# Patient Record
Sex: MALE | Race: WHITE | Employment: OTHER | URBAN - METROPOLITAN AREA
[De-identification: names, ages, dates, MRNs, and addresses within clinical notes are randomized per-mention and may not be internally consistent; named-entity substitution may affect disease eponyms.]

---

## 2017-01-22 ENCOUNTER — HOSPITAL ENCOUNTER (INPATIENT)
Age: 72
LOS: 4 days | Discharge: HOME | DRG: 871 | End: 2017-01-26
Attending: EMERGENCY MEDICINE | Admitting: FAMILY MEDICINE
Payer: MEDICARE

## 2017-01-22 ENCOUNTER — APPOINTMENT (OUTPATIENT)
Dept: GENERAL RADIOLOGY | Age: 72
DRG: 871 | End: 2017-01-22
Attending: EMERGENCY MEDICINE
Payer: MEDICARE

## 2017-01-22 DIAGNOSIS — E86.0 DEHYDRATION: ICD-10-CM

## 2017-01-22 DIAGNOSIS — R06.2 WHEEZING: ICD-10-CM

## 2017-01-22 DIAGNOSIS — J18.9 COMMUNITY ACQUIRED PNEUMONIA: Primary | ICD-10-CM

## 2017-01-22 DIAGNOSIS — R09.02 HYPOXIA: ICD-10-CM

## 2017-01-22 LAB
ALBUMIN SERPL BCP-MCNC: 3.3 G/DL (ref 3.5–5)
ALBUMIN/GLOB SERPL: 0.8 {RATIO} (ref 1.1–2.2)
ALP SERPL-CCNC: 112 U/L (ref 45–117)
ALT SERPL-CCNC: 17 U/L (ref 12–78)
ANION GAP BLD CALC-SCNC: 8 MMOL/L (ref 5–15)
AST SERPL W P-5'-P-CCNC: 14 U/L (ref 15–37)
BASOPHILS # BLD AUTO: 0 K/UL (ref 0–0.1)
BASOPHILS # BLD: 0 % (ref 0–1)
BILIRUB SERPL-MCNC: 0.5 MG/DL (ref 0.2–1)
BUN SERPL-MCNC: 12 MG/DL (ref 6–20)
BUN/CREAT SERPL: 9 (ref 12–20)
CALCIUM SERPL-MCNC: 9.4 MG/DL (ref 8.5–10.1)
CHLORIDE SERPL-SCNC: 98 MMOL/L (ref 97–108)
CK SERPL-CCNC: 41 U/L (ref 39–308)
CO2 SERPL-SCNC: 27 MMOL/L (ref 21–32)
CREAT SERPL-MCNC: 1.3 MG/DL (ref 0.7–1.3)
EOSINOPHIL # BLD: 0.5 K/UL (ref 0–0.4)
EOSINOPHIL NFR BLD: 4 % (ref 0–7)
ERYTHROCYTE [DISTWIDTH] IN BLOOD BY AUTOMATED COUNT: 13.6 % (ref 11.5–14.5)
GLOBULIN SER CALC-MCNC: 4.1 G/DL (ref 2–4)
GLUCOSE SERPL-MCNC: 141 MG/DL (ref 65–100)
HCT VFR BLD AUTO: 37.1 % (ref 36.6–50.3)
HGB BLD-MCNC: 12.3 G/DL (ref 12.1–17)
LACTATE SERPL-SCNC: 1.5 MMOL/L (ref 0.4–2)
LYMPHOCYTES # BLD AUTO: 7 % (ref 12–49)
LYMPHOCYTES # BLD: 0.9 K/UL (ref 0.8–3.5)
MCH RBC QN AUTO: 31.7 PG (ref 26–34)
MCHC RBC AUTO-ENTMCNC: 33.2 G/DL (ref 30–36.5)
MCV RBC AUTO: 95.6 FL (ref 80–99)
MONOCYTES # BLD: 1 K/UL (ref 0–1)
MONOCYTES NFR BLD AUTO: 8 % (ref 5–13)
NEUTS SEG # BLD: 9.7 K/UL (ref 1.8–8)
NEUTS SEG NFR BLD AUTO: 81 % (ref 32–75)
PLATELET # BLD AUTO: 294 K/UL (ref 150–400)
POTASSIUM SERPL-SCNC: 4.6 MMOL/L (ref 3.5–5.1)
PROT SERPL-MCNC: 7.4 G/DL (ref 6.4–8.2)
RBC # BLD AUTO: 3.88 M/UL (ref 4.1–5.7)
SODIUM SERPL-SCNC: 133 MMOL/L (ref 136–145)
TROPONIN I SERPL-MCNC: <0.04 NG/ML
WBC # BLD AUTO: 12.1 K/UL (ref 4.1–11.1)

## 2017-01-22 PROCEDURE — 74011000250 HC RX REV CODE- 250: Performed by: EMERGENCY MEDICINE

## 2017-01-22 PROCEDURE — 71020 XR CHEST PA LAT: CPT

## 2017-01-22 PROCEDURE — 96361 HYDRATE IV INFUSION ADD-ON: CPT

## 2017-01-22 PROCEDURE — 85025 COMPLETE CBC W/AUTO DIFF WBC: CPT | Performed by: EMERGENCY MEDICINE

## 2017-01-22 PROCEDURE — 65660000000 HC RM CCU STEPDOWN

## 2017-01-22 PROCEDURE — 99285 EMERGENCY DEPT VISIT HI MDM: CPT

## 2017-01-22 PROCEDURE — 83605 ASSAY OF LACTIC ACID: CPT | Performed by: EMERGENCY MEDICINE

## 2017-01-22 PROCEDURE — 74011250636 HC RX REV CODE- 250/636: Performed by: EMERGENCY MEDICINE

## 2017-01-22 PROCEDURE — 87040 BLOOD CULTURE FOR BACTERIA: CPT | Performed by: EMERGENCY MEDICINE

## 2017-01-22 PROCEDURE — 96375 TX/PRO/DX INJ NEW DRUG ADDON: CPT

## 2017-01-22 PROCEDURE — 36415 COLL VENOUS BLD VENIPUNCTURE: CPT | Performed by: EMERGENCY MEDICINE

## 2017-01-22 PROCEDURE — 96365 THER/PROPH/DIAG IV INF INIT: CPT

## 2017-01-22 PROCEDURE — 77030029684 HC NEB SM VOL KT MONA -A

## 2017-01-22 PROCEDURE — 93005 ELECTROCARDIOGRAM TRACING: CPT

## 2017-01-22 PROCEDURE — 80053 COMPREHEN METABOLIC PANEL: CPT | Performed by: EMERGENCY MEDICINE

## 2017-01-22 PROCEDURE — 82550 ASSAY OF CK (CPK): CPT | Performed by: EMERGENCY MEDICINE

## 2017-01-22 PROCEDURE — 84484 ASSAY OF TROPONIN QUANT: CPT | Performed by: EMERGENCY MEDICINE

## 2017-01-22 PROCEDURE — 94640 AIRWAY INHALATION TREATMENT: CPT

## 2017-01-22 RX ORDER — LEVOFLOXACIN 5 MG/ML
750 INJECTION, SOLUTION INTRAVENOUS
Status: COMPLETED | OUTPATIENT
Start: 2017-01-22 | End: 2017-01-22

## 2017-01-22 RX ORDER — ACETAMINOPHEN 325 MG/1
650 TABLET ORAL
Status: DISCONTINUED | OUTPATIENT
Start: 2017-01-22 | End: 2017-01-26 | Stop reason: HOSPADM

## 2017-01-22 RX ORDER — SODIUM CHLORIDE 9 MG/ML
125 INJECTION, SOLUTION INTRAVENOUS CONTINUOUS
Status: DISCONTINUED | OUTPATIENT
Start: 2017-01-22 | End: 2017-01-26 | Stop reason: HOSPADM

## 2017-01-22 RX ORDER — DIPHENHYDRAMINE HYDROCHLORIDE 50 MG/ML
25 INJECTION, SOLUTION INTRAMUSCULAR; INTRAVENOUS
Status: DISCONTINUED | OUTPATIENT
Start: 2017-01-22 | End: 2017-01-26 | Stop reason: HOSPADM

## 2017-01-22 RX ORDER — DIPHENHYDRAMINE HYDROCHLORIDE 50 MG/ML
25 INJECTION, SOLUTION INTRAMUSCULAR; INTRAVENOUS
Status: COMPLETED | OUTPATIENT
Start: 2017-01-22 | End: 2017-01-22

## 2017-01-22 RX ORDER — LEVOFLOXACIN 5 MG/ML
750 INJECTION, SOLUTION INTRAVENOUS EVERY 24 HOURS
Status: DISCONTINUED | OUTPATIENT
Start: 2017-01-23 | End: 2017-01-26 | Stop reason: HOSPADM

## 2017-01-22 RX ADMIN — SODIUM CHLORIDE 1000 ML: 900 INJECTION, SOLUTION INTRAVENOUS at 21:25

## 2017-01-22 RX ADMIN — DIPHENHYDRAMINE HYDROCHLORIDE 25 MG: 50 INJECTION, SOLUTION INTRAMUSCULAR; INTRAVENOUS at 21:25

## 2017-01-22 RX ADMIN — ALBUTEROL SULFATE 1 DOSE: 2.5 SOLUTION RESPIRATORY (INHALATION) at 21:57

## 2017-01-22 RX ADMIN — METHYLPREDNISOLONE SODIUM SUCCINATE 125 MG: 125 INJECTION, POWDER, FOR SOLUTION INTRAMUSCULAR; INTRAVENOUS at 21:25

## 2017-01-22 RX ADMIN — LEVOFLOXACIN 750 MG: 5 INJECTION, SOLUTION INTRAVENOUS at 22:02

## 2017-01-22 NOTE — IP AVS SNAPSHOT
Current Discharge Medication List  
  
Take these medications at their scheduled times Dose & Instructions Dispensing Information Comments Morning Noon Evening Bedtime  
 allopurinol 300 mg tablet Commonly known as:  Ardie Fleischer Your next dose is: Today, Tomorrow Other:  ____________ Dose:  300 mg Take 1 tablet by mouth two (2) times a day. For gout prevention Quantity:  60 tablet Refills:  5  
     
   
   
   
  
 amLODIPine 10 mg tablet Commonly known as:  [place] Your next dose is: Today, Tomorrow Other:  ____________ Dose:  10 mg Take 1 Tab by mouth daily. Quantity:  30 Tab Refills:  2  
     
   
   
   
  
 aspirin 81 mg chewable tablet Your next dose is: Today, Tomorrow Other:  ____________ Dose:  81 mg Take 1 Tab by mouth daily. Indications: TRANSIENT CEREBRAL ISCHEMIA Quantity:  30 Tab Refills:  1  
     
   
   
   
  
 atorvastatin 20 mg tablet Commonly known as:  LIPITOR Your next dose is: Today, Tomorrow Other:  ____________ Dose:  10 mg Take 10 mg by mouth daily. Refills:  0  
     
   
   
   
  
 citalopram 40 mg tablet Commonly known as:  Gildardo [place] Your next dose is: Today, Tomorrow Other:  ____________ Dose:  40 mg Take 1 Tab by mouth daily. Quantity:  90 Tab Refills:  1 CYMBALTA 20 mg capsule Generic drug:  DULoxetine Your next dose is: Today, Tomorrow Other:  ____________ Dose:  60 mg Take 60 mg by mouth daily. Refills:  0  
     
   
   
   
  
 levoFLOXacin 500 mg tablet Commonly known as:  Daniels Armor Your next dose is: Today, Tomorrow Other:  ____________ Dose:  500 mg Take 1 Tab by mouth daily for 4 days. Quantity:  4 Tab Refills:  0  
     
   
   
   
  
 lisinopril 20 mg tablet Commonly known as:  Rosalio Pichardo  
   
 Your next dose is: Today, Tomorrow Other:  ____________ Dose:  20 mg Take 1 Tab by mouth daily. Quantity:  30 Tab Refills:  5  
     
   
   
   
  
 meloxicam 15 mg tablet Commonly known as:  MOBIC Your next dose is: Today, Tomorrow Other:  ____________ Dose:  15 mg Take 1 Tab by mouth daily. Quantity:  30 Tab Refills:  5  
     
   
   
   
  
 metFORMIN 500 mg tablet Commonly known as:  GLUCOPHAGE Your next dose is: Today, Tomorrow Other:  ____________ Dose:  1000 mg Take 1,000 mg by mouth daily (with breakfast). Refills:  0  
     
   
   
   
  
 multivitamins-minerals-lutein Tab tablet Commonly known as:  CENTRUM SILVER Your next dose is: Today, Tomorrow Other:  ____________ Dose:  1 Tab Take 1 Tab by mouth daily. Quantity:  90 Tab Refills:  1  
     
   
   
   
  
 pantoprazole 40 mg tablet Commonly known as:  PROTONIX Your next dose is: Today, Tomorrow Other:  ____________ Dose:  40 mg Take 1 Tab by mouth daily. Quantity:  30 Tab Refills:  5 Saccharomyces boulardii 250 mg capsule Commonly known as:  Iván Controls Your next dose is: Today, Tomorrow Other:  ____________ Dose:  250 mg Take 1 Cap by mouth two (2) times a day for 30 days. OK to substitute other over-the-counter probiotic Quantity:  60 Cap Refills:  0 Take these medications as directed Dose & Instructions Dispensing Information Comments Morning Noon Evening Bedtime  
 albuterol 90 mcg/actuation inhaler Commonly known as:  PROVENTIL HFA, VENTOLIN HFA, PROAIR HFA Your next dose is: Today, Tomorrow Other:  ____________ Please take 2 puffs by inhalation through spacer four times a day for the next week.  After that, please reduce the frequency to every 6 hours as needed for shortness of breath or wheezing. Quantity:  1 Inhaler Refills:  1  
     
   
   
   
  
 gabapentin 800 mg tablet Commonly known as:  NEURONTIN Your next dose is: Today, Tomorrow Other:  ____________ TAKE TWO TABLETS BY MOUTH TWICE DAILY Quantity:  120 Tab Refills:  0  
     
   
   
   
  
 OTHER(NON-FORMULARY) Your next dose is: Today, Tomorrow Other:  ____________ Physical therapy to eval and treat Quantity:  1 Each Refills:  0  
     
   
   
   
  
 VITAMIN D3 2,000 unit Tab Generic drug:  cholecalciferol (vitamin D3) Your next dose is: Today, Tomorrow Other:  ____________ Take  by mouth. Refills:  0 Where to Get Your Medications These medications were sent to [id number] [employer] [facility],[address], [address] [place] [address] Way Phone:  [phone]  
  levoFLOXacin 500 mg tablet Saccharomyces boulardii 250 mg capsule Information about where to get these medications is not yet available ! Ask your nurse or doctor about these medications  
  albuterol 90 mcg/actuation inhaler

## 2017-01-22 NOTE — IP AVS SNAPSHOT
[address] 
[phone] Patient: Dipika Brock MRN: [id number] :1945 You are allergic to the following Allergen Reactions Cefdinir Hives Crestor (Rosuvastatin) Myalgia Recent Documentation Height Weight BMI Smoking Status 1.778 m 83 kg 26.26 kg/m2 Former Smoker Unresulted Labs Order Current Status CULTURE, BLOOD, PAIRED Preliminary result Emergency Contacts Name Discharge Info Relation Home Work Mobile [address] [place] CAREGIVER [3] Spouse [3] [phone] About your hospitalization You were admitted on:  2017 You last received care in the:  [facility] 2[facility] MED SURG You were discharged on:  2017 Unit phone number:  [phone] Why you were hospitalized Your primary diagnosis was:  Pneumonia Providers Seen During Your Hospitalizations Provider Role Specialty Primary office phone Severa Fergusson, MD Attending Provider Emergency Medicine [phone] Amandeep Coy MD Attending Provider [facility] [phone] Orion Lorenzo MD Attending Provider Internal Medicine [phone] Your Primary Care Physician (PCP) Primary Care Physician Office Phone Office Fax Rhiannon Estrada [phone] Follow-up Information Follow up With Details Comments Contact Info Ligia Sesay MD Schedule an appointment as soon as possible for a visit on 2017 For follow up after hospitalization *** PCP follow up appointment on Thursday, 17 @ 9:50 a.m. [address] [address] [phone] [employer]  This company will provide you with home oxygen. [address] [facility],[address] [facility] [id number] [phone] Current Discharge Medication List  
  
START taking these medications Dose & Instructions Dispensing Information Comments Morning Noon Evening Bedtime  
 albuterol 90 mcg/actuation inhaler Commonly known as:  PROVENTIL HFA, VENTOLIN HFA, PROAIR HFA Your next dose is: Today, Tomorrow Other:  _________ Please take 2 puffs by inhalation through spacer four times a day for the next week. After that, please reduce the frequency to every 6 hours as needed for shortness of breath or wheezing. Quantity:  1 Inhaler Refills:  1  
     
   
   
   
  
 levoFLOXacin 500 mg tablet Commonly known as:  Dorothy Rich Your next dose is: Today, Tomorrow Other:  _________ Dose:  500 mg Take 1 Tab by mouth daily for 4 days. Quantity:  4 Tab Refills:  0 Saccharomyces boulardii 250 mg capsule Commonly known as:  Iván Dunne Your next dose is: Today, Tomorrow Other:  _________ Dose:  250 mg Take 1 Cap by mouth two (2) times a day for 30 days. OK to substitute other over-the-counter probiotic Quantity:  60 Cap Refills:  0 CONTINUE these medications which have CHANGED Dose & Instructions Dispensing Information Comments Morning Noon Evening Bedtime  
 amLODIPine 10 mg tablet Commonly known as:  Theresa Payne What changed:  how much to take Your next dose is: Today, Tomorrow Other:  _________ Dose:  10 mg Take 1 Tab by mouth daily. Quantity:  30 Tab Refills:  2 CONTINUE these medications which have NOT CHANGED Dose & Instructions Dispensing Information Comments Morning Noon Evening Bedtime  
 allopurinol 300 mg tablet Commonly known as:  Ermelinda Bolus Your next dose is: Today, Tomorrow Other:  _________ Dose:  300 mg Take 1 tablet by mouth two (2) times a day. For gout prevention Quantity:  60 tablet Refills:  5  
     
   
   
   
  
 aspirin 81 mg chewable tablet Your next dose is: Today, Tomorrow Other:  _________ Dose:  81 mg Take 1 Tab by mouth daily. Indications: TRANSIENT CEREBRAL ISCHEMIA Quantity:  30 Tab Refills:  1  
     
   
   
   
  
 atorvastatin 20 mg tablet Commonly known as:  LIPITOR Your next dose is: Today, Tomorrow Other:  _________ Dose:  10 mg Take 10 mg by mouth daily. Refills:  0  
     
   
   
   
  
 citalopram 40 mg tablet Commonly known as:  Samaria Cox Your next dose is: Today, Tomorrow Other:  _________ Dose:  40 mg Take 1 Tab by mouth daily. Quantity:  90 Tab Refills:  1 CYMBALTA 20 mg capsule Generic drug:  DULoxetine Your next dose is: Today, Tomorrow Other:  _________ Dose:  60 mg Take 60 mg by mouth daily. Refills:  0  
     
   
   
   
  
 gabapentin 800 mg tablet Commonly known as:  NEURONTIN Your next dose is: Today, Tomorrow Other:  _________ TAKE TWO TABLETS BY MOUTH TWICE DAILY Quantity:  120 Tab Refills:  0  
     
   
   
   
  
 lisinopril 20 mg tablet Commonly known as:  Merilynn Aamir Your next dose is: Today, Tomorrow Other:  _________ Dose:  20 mg Take 1 Tab by mouth daily. Quantity:  30 Tab Refills:  5  
     
   
   
   
  
 meloxicam 15 mg tablet Commonly known as:  MOBIC Your next dose is: Today, Tomorrow Other:  _________ Dose:  15 mg Take 1 Tab by mouth daily. Quantity:  30 Tab Refills:  5  
     
   
   
   
  
 metFORMIN 500 mg tablet Commonly known as:  GLUCOPHAGE Your next dose is: Today, Tomorrow Other:  _________ Dose:  1000 mg Take 1,000 mg by mouth daily (with breakfast). Refills:  0  
     
   
   
   
  
 multivitamins-minerals-lutein Tab tablet Commonly known as:  CENTRUM SILVER Your next dose is: Today, Tomorrow Other:  _________ Dose:  1 Tab Take 1 Tab by mouth daily. Quantity:  90 Tab Refills:  1 OTHER(NON-FORMULARY) Your next dose is: Today, Tomorrow Other:  _________ Physical therapy to eval and treat Quantity:  1 Each Refills:  0  
     
   
   
   
  
 pantoprazole 40 mg tablet Commonly known as:  PROTONIX Your next dose is: Today, Tomorrow Other:  _________ Dose:  40 mg Take 1 Tab by mouth daily. Quantity:  30 Tab Refills:  5 VITAMIN D3 2,000 unit Tab Generic drug:  cholecalciferol (vitamin D3) Your next dose is: Today, Tomorrow Other:  _________ Take  by mouth. Refills:  0 STOP taking these medications   
 cefdinir 300 mg capsule Commonly known as:  OMNICEF Where to Get Your Medications These medications were sent to [id number] [employer],[address], [address] [facility], [address] Phone:  [phone]  
  levoFLOXacin 500 mg tablet Saccharomyces boulardii 250 mg capsule Information on where to get these meds will be given to you by the nurse or doctor. ! Ask your nurse or doctor about these medications  
  albuterol 90 mcg/actuation inhaler Discharge Instructions Please bring this form with you to show your primary care provider at your follow-up appointment. Primary care provider:  Dr. Clayton Kay MD 
 
Discharging provider:  Abby Pike MD 
 
You have been admitted to the hospital with the following diagnoses: · Pneumonia FOLLOW-UP CARE RECOMMENDATIONS: 
 
APPOINTMENTS: 
Follow-up Information Follow up With Details Comments Contact Info Clayton Kay MD Schedule an appointment as soon as possible for a visit in 1 week For follow up after hospitalization [address] [address] [phone] FOLLOW-UP TESTS recommended: · Chest X-ray in 4-6 weeks to ensure resolution of pneumonia · 6-minute walk with pulse oximetry in 2 weeks to check on continued need for oxygen SYMPTOMS to watch for: chest pain, shortness of breath, fever, chills, nausea, vomiting, diarrhea. DIET/what to eat:  Diabetic Diet ACTIVITY:  Activity as tolerated OTHER instructions:  Please use your oxygen by nasal cannula at 2 liters per minute continuously. Follow up with your primary care doctor for testing to see if you continue to need this. What to do if new or unexpected symptoms occur? If you experience any of the above symptoms (or should other concerns or questions arise after discharge) please call your primary care physician. Return to the emergency room if you cannot get hold of your doctor. · It is very important that you keep your follow-up appointment(s). · Please bring discharge papers, medication list (and/or medication bottles) to your follow-up appointments for review by your outpatient provider(s). · Please check the list of medications and be sure it includes every medication (even non-prescription medications) that your provider wants you to take. · It is important that you take the medication exactly as they are prescribed. · Keep your medication in the bottles provided by the pharmacist and keep a list of the medication names, dosages, and times to be taken in your wallet. · Do not take other medications without consulting your doctor. · If you have any questions about your medications or other instructions, please talk to your nurse or care provider before you leave the hospital. 
 
I understand that if any problems occur once I am at home I am to contact my physician. These instructions were explained to me and I had the opportunity to ask questions. Discharge Orders None [facility] Announcement We are excited to announce that we are making your provider's discharge notes available to you in [employer]. You will see these notes when they are completed and signed by the physician that discharged you from your recent hospital stay. If you have any questions or concerns about any information you see in [employer], please call the Health Information Department where you were seen or reach out to your Primary Care Provider for more information about your plan of care. Introducing \[facility]\"" & HEALTH SERVICES! Dear Mateo Mathew: Thank you for requesting a [employer] account. Our records indicate that you already have an active [employer] account. You can access your account anytime at [email or web address] Did you know that you can access your hospital and ER discharge instructions at any time in [employer]? You can also review all of your test results from your hospital stay or ER visit. Additional Information If you have questions, please visit the Frequently Asked Questions section of the [employer] website at [email or web address] Remember, [employer] is NOT to be used for urgent needs. For medical emergencies, dial 911. Now available from your iPhone and Android! General Information Please provide this summary of care documentation to your next provider. Patient Signature:  ____________________________________________________________ Date:  ____________________________________________________________  
  
Reyna Cordoba Provider Signature:  ____________________________________________________________ Date:  ____________________________________________________________

## 2017-01-23 LAB
ANION GAP BLD CALC-SCNC: 12 MMOL/L (ref 5–15)
ARTERIAL PATENCY WRIST A: NO
ATRIAL RATE: 112 BPM
BASE DEFICIT BLD-SCNC: 5 MMOL/L
BASOPHILS # BLD AUTO: 0 K/UL (ref 0–0.1)
BASOPHILS # BLD: 0 % (ref 0–1)
BDY SITE: ABNORMAL
BUN SERPL-MCNC: 12 MG/DL (ref 6–20)
BUN/CREAT SERPL: 9 (ref 12–20)
CALCIUM SERPL-MCNC: 8.7 MG/DL (ref 8.5–10.1)
CALCULATED P AXIS, ECG09: 41 DEGREES
CALCULATED R AXIS, ECG10: -6 DEGREES
CALCULATED T AXIS, ECG11: 85 DEGREES
CHLORIDE SERPL-SCNC: 101 MMOL/L (ref 97–108)
CO2 SERPL-SCNC: 21 MMOL/L (ref 21–32)
CREAT SERPL-MCNC: 1.34 MG/DL (ref 0.7–1.3)
DIAGNOSIS, 93000: NORMAL
DIFFERENTIAL METHOD BLD: ABNORMAL
EOSINOPHIL # BLD: 0 K/UL (ref 0–0.4)
EOSINOPHIL NFR BLD: 0 % (ref 0–7)
ERYTHROCYTE [DISTWIDTH] IN BLOOD BY AUTOMATED COUNT: 13.5 % (ref 11.5–14.5)
GAS FLOW.O2 O2 DELIVERY SYS: ABNORMAL L/MIN
GAS FLOW.O2 SETTING OXYMISER: 3 L/M
GLUCOSE BLD STRIP.AUTO-MCNC: 156 MG/DL (ref 65–100)
GLUCOSE BLD STRIP.AUTO-MCNC: 163 MG/DL (ref 65–100)
GLUCOSE SERPL-MCNC: 255 MG/DL (ref 65–100)
HCO3 BLD-SCNC: 20.3 MMOL/L (ref 22–26)
HCT VFR BLD AUTO: 34 % (ref 36.6–50.3)
HGB BLD-MCNC: 11.2 G/DL (ref 12.1–17)
LYMPHOCYTES # BLD AUTO: 3 % (ref 12–49)
LYMPHOCYTES # BLD: 0.5 K/UL (ref 0.8–3.5)
MCH RBC QN AUTO: 31.5 PG (ref 26–34)
MCHC RBC AUTO-ENTMCNC: 32.9 G/DL (ref 30–36.5)
MCV RBC AUTO: 95.8 FL (ref 80–99)
MONOCYTES # BLD: 0.3 K/UL (ref 0–1)
MONOCYTES NFR BLD AUTO: 2 % (ref 5–13)
NEUTS BAND NFR BLD MANUAL: 7 % (ref 0–6)
NEUTS SEG # BLD: 15.1 K/UL (ref 1.8–8)
NEUTS SEG NFR BLD AUTO: 88 % (ref 32–75)
P-R INTERVAL, ECG05: 146 MS
PCO2 BLD: 34 MMHG (ref 35–45)
PH BLD: 7.38 [PH] (ref 7.35–7.45)
PLATELET # BLD AUTO: 258 K/UL (ref 150–400)
PO2 BLD: 98 MMHG (ref 80–100)
POTASSIUM SERPL-SCNC: 4.5 MMOL/L (ref 3.5–5.1)
Q-T INTERVAL, ECG07: 328 MS
QRS DURATION, ECG06: 92 MS
QTC CALCULATION (BEZET), ECG08: 447 MS
RBC # BLD AUTO: 3.55 M/UL (ref 4.1–5.7)
RBC MORPH BLD: ABNORMAL
SAO2 % BLD: 98 % (ref 92–97)
SERVICE CMNT-IMP: ABNORMAL
SERVICE CMNT-IMP: ABNORMAL
SODIUM SERPL-SCNC: 134 MMOL/L (ref 136–145)
SPECIMEN TYPE: ABNORMAL
VENTRICULAR RATE, ECG03: 112 BPM
WBC # BLD AUTO: 15.9 K/UL (ref 4.1–11.1)
WBC MORPH BLD: ABNORMAL

## 2017-01-23 PROCEDURE — 74011250637 HC RX REV CODE- 250/637: Performed by: HOSPITALIST

## 2017-01-23 PROCEDURE — 65270000029 HC RM PRIVATE

## 2017-01-23 PROCEDURE — 85025 COMPLETE CBC W/AUTO DIFF WBC: CPT | Performed by: FAMILY MEDICINE

## 2017-01-23 PROCEDURE — 74011250636 HC RX REV CODE- 250/636: Performed by: FAMILY MEDICINE

## 2017-01-23 PROCEDURE — 82962 GLUCOSE BLOOD TEST: CPT

## 2017-01-23 PROCEDURE — 74011636637 HC RX REV CODE- 636/637: Performed by: HOSPITALIST

## 2017-01-23 PROCEDURE — 36600 WITHDRAWAL OF ARTERIAL BLOOD: CPT

## 2017-01-23 PROCEDURE — 82803 BLOOD GASES ANY COMBINATION: CPT

## 2017-01-23 PROCEDURE — 80048 BASIC METABOLIC PNL TOTAL CA: CPT | Performed by: FAMILY MEDICINE

## 2017-01-23 PROCEDURE — 77010033678 HC OXYGEN DAILY

## 2017-01-23 PROCEDURE — 36415 COLL VENOUS BLD VENIPUNCTURE: CPT | Performed by: FAMILY MEDICINE

## 2017-01-23 PROCEDURE — 74011250637 HC RX REV CODE- 250/637: Performed by: FAMILY MEDICINE

## 2017-01-23 RX ORDER — GABAPENTIN 400 MG/1
800 CAPSULE ORAL 2 TIMES DAILY
Status: DISCONTINUED | OUTPATIENT
Start: 2017-01-23 | End: 2017-01-26 | Stop reason: HOSPADM

## 2017-01-23 RX ORDER — SODIUM CHLORIDE 0.9 % (FLUSH) 0.9 %
5-10 SYRINGE (ML) INJECTION EVERY 8 HOURS
Status: DISCONTINUED | OUTPATIENT
Start: 2017-01-23 | End: 2017-01-26 | Stop reason: HOSPADM

## 2017-01-23 RX ORDER — INSULIN LISPRO 100 [IU]/ML
INJECTION, SOLUTION INTRAVENOUS; SUBCUTANEOUS
Status: DISCONTINUED | OUTPATIENT
Start: 2017-01-23 | End: 2017-01-26 | Stop reason: HOSPADM

## 2017-01-23 RX ORDER — SODIUM CHLORIDE 0.9 % (FLUSH) 0.9 %
5-10 SYRINGE (ML) INJECTION AS NEEDED
Status: DISCONTINUED | OUTPATIENT
Start: 2017-01-23 | End: 2017-01-26 | Stop reason: HOSPADM

## 2017-01-23 RX ORDER — LISINOPRIL 20 MG/1
20 TABLET ORAL DAILY
Status: DISCONTINUED | OUTPATIENT
Start: 2017-01-23 | End: 2017-01-26 | Stop reason: HOSPADM

## 2017-01-23 RX ORDER — METFORMIN HYDROCHLORIDE 500 MG/1
1000 TABLET ORAL
COMMUNITY

## 2017-01-23 RX ORDER — ENOXAPARIN SODIUM 100 MG/ML
40 INJECTION SUBCUTANEOUS EVERY 24 HOURS
Status: DISCONTINUED | OUTPATIENT
Start: 2017-01-23 | End: 2017-01-24

## 2017-01-23 RX ORDER — CEFDINIR 300 MG/1
300 CAPSULE ORAL 2 TIMES DAILY
COMMUNITY
Start: 2017-01-13 | End: 2017-01-26

## 2017-01-23 RX ORDER — DEXTROSE 50 % IN WATER (D50W) INTRAVENOUS SYRINGE
12.5-25 AS NEEDED
Status: DISCONTINUED | OUTPATIENT
Start: 2017-01-23 | End: 2017-01-26 | Stop reason: HOSPADM

## 2017-01-23 RX ORDER — CHOLECALCIFEROL (VITAMIN D3) 125 MCG
CAPSULE ORAL
COMMUNITY

## 2017-01-23 RX ORDER — PANTOPRAZOLE SODIUM 40 MG/1
40 TABLET, DELAYED RELEASE ORAL DAILY
Status: DISCONTINUED | OUTPATIENT
Start: 2017-01-23 | End: 2017-01-26 | Stop reason: HOSPADM

## 2017-01-23 RX ORDER — ATORVASTATIN CALCIUM 20 MG/1
10 TABLET, FILM COATED ORAL DAILY
COMMUNITY

## 2017-01-23 RX ORDER — GUAIFENESIN 100 MG/5ML
81 LIQUID (ML) ORAL DAILY
Status: DISCONTINUED | OUTPATIENT
Start: 2017-01-23 | End: 2017-01-26 | Stop reason: HOSPADM

## 2017-01-23 RX ORDER — AMLODIPINE BESYLATE 5 MG/1
10 TABLET ORAL DAILY
Status: DISCONTINUED | OUTPATIENT
Start: 2017-01-23 | End: 2017-01-26 | Stop reason: HOSPADM

## 2017-01-23 RX ORDER — CITALOPRAM 20 MG/1
20 TABLET, FILM COATED ORAL DAILY
Status: DISCONTINUED | OUTPATIENT
Start: 2017-01-23 | End: 2017-01-26 | Stop reason: HOSPADM

## 2017-01-23 RX ORDER — DULOXETIN HYDROCHLORIDE 20 MG/1
20 CAPSULE, DELAYED RELEASE ORAL
Status: DISCONTINUED | OUTPATIENT
Start: 2017-01-23 | End: 2017-01-23

## 2017-01-23 RX ORDER — MAGNESIUM SULFATE 100 %
4 CRYSTALS MISCELLANEOUS AS NEEDED
Status: DISCONTINUED | OUTPATIENT
Start: 2017-01-23 | End: 2017-01-26 | Stop reason: HOSPADM

## 2017-01-23 RX ADMIN — SODIUM CHLORIDE 125 ML/HR: 900 INJECTION, SOLUTION INTRAVENOUS at 00:40

## 2017-01-23 RX ADMIN — DULOXETINE HYDROCHLORIDE 20 MG: 20 CAPSULE, DELAYED RELEASE ORAL at 01:14

## 2017-01-23 RX ADMIN — INSULIN LISPRO 2 UNITS: 100 INJECTION, SOLUTION INTRAVENOUS; SUBCUTANEOUS at 17:05

## 2017-01-23 RX ADMIN — PANTOPRAZOLE SODIUM 40 MG: 40 TABLET, DELAYED RELEASE ORAL at 09:09

## 2017-01-23 RX ADMIN — ASPIRIN 81 MG CHEWABLE TABLET 81 MG: 81 TABLET CHEWABLE at 09:09

## 2017-01-23 RX ADMIN — Medication 10 ML: at 06:35

## 2017-01-23 RX ADMIN — Medication 10 ML: at 21:45

## 2017-01-23 RX ADMIN — GABAPENTIN 800 MG: 400 CAPSULE ORAL at 17:05

## 2017-01-23 RX ADMIN — GUAIFENESIN 600 MG: 600 TABLET, EXTENDED RELEASE ORAL at 15:01

## 2017-01-23 RX ADMIN — GABAPENTIN 800 MG: 400 CAPSULE ORAL at 09:09

## 2017-01-23 RX ADMIN — Medication 10 ML: at 15:01

## 2017-01-23 RX ADMIN — ENOXAPARIN SODIUM 40 MG: 40 INJECTION SUBCUTANEOUS at 09:09

## 2017-01-23 RX ADMIN — GUAIFENESIN 600 MG: 600 TABLET, EXTENDED RELEASE ORAL at 21:43

## 2017-01-23 RX ADMIN — LISINOPRIL 20 MG: 20 TABLET ORAL at 09:09

## 2017-01-23 RX ADMIN — AMLODIPINE BESYLATE 10 MG: 5 TABLET ORAL at 09:09

## 2017-01-23 RX ADMIN — SODIUM CHLORIDE 125 ML/HR: 900 INJECTION, SOLUTION INTRAVENOUS at 06:38

## 2017-01-23 RX ADMIN — CITALOPRAM HYDROBROMIDE 20 MG: 20 TABLET ORAL at 09:09

## 2017-01-23 RX ADMIN — LEVOFLOXACIN 750 MG: 5 INJECTION, SOLUTION INTRAVENOUS at 21:44

## 2017-01-23 RX ADMIN — SODIUM CHLORIDE 125 ML/HR: 900 INJECTION, SOLUTION INTRAVENOUS at 18:30

## 2017-01-23 NOTE — ED PROVIDER NOTES
HPI Comments: 70 y.o. male with past medical history significant for DM, CKD, bladder CA, TIA, HTN, abnormal kidney function who presents with chief complaint of congestion. Pt reports being \"sick\" for the past couple of months with sxs of cough with secondary CP, sinus drainage, SOB, congestion, fever, and chills, for which he has tried two different courses of ABX and 1 course of steroids with no relief (Pt does not remember the name of the ABX started last month, and notes started a second ABX 10 days ago both by the PCP). Per the spouse, the pt sent her to get his ABX today and she thinks that she may have given him the wrong one, after which she gave him mucinex today, and now she notes that the pt has hives to his chest and abdomen which is new for him. She also says that the pt had a recent chest xray that was suspicious. She reports that he went to an ENT recently for his frequent sinus drainage but does now know what was done there. Pt denies leg swelling. There are no other acute medical concerns at this time. Social hx: former smoker, \"many moons ago\". Does not use inhaler or nebulizer at home. PCP: Claude Hester MD    Note written by Frantz East, as dictated by Bam Chavez MD 9:06 PM      The history is provided by the patient.         Past Medical History:   Diagnosis Date    Bladder cancer ([facility] 2010    Cancer [facility])      bladder    Chronic kidney disease      reduced kidney function    Depression     Diabetes ([facility]     Gout     Hypercholesterolemia     Hypertension     Kidney function abnormal     Stroke ([facility]      TIA    Wears dentures        Past Surgical History:   Procedure Laterality Date    Hx colonoscopy  2014    Hx orthopaedic       right shoulder rotator cuff    Hx orthopaedic       left knee repair    Hx cataract removal      Hx retinal detachment repair           Family History:   Problem Relation Age of Onset    Heart Disease Mother     Heart Disease Father Social History     Social History    Marital status:      Spouse name: N/A    Number of children: N/A    Years of education: N/A     Occupational History    Not on file. Social History Main Topics    Smoking status: Former Smoker    Smokeless tobacco: Never Used    Alcohol use No    Drug use: No    Sexual activity: Not on file     Other Topics Concern    Not on file     Social History Narrative         ALLERGIES: Crestor [rosuvastatin]    Review of Systems   Constitutional: Positive for chills and fever. Negative for activity change. HENT: Positive for congestion and postnasal drip. Eyes: Negative for pain. Respiratory: Positive for cough and shortness of breath. Cardiovascular: Positive for chest pain. Negative for leg swelling. Gastrointestinal: Negative for abdominal pain. Endocrine: Negative for polyuria. Genitourinary: Negative for flank pain and hematuria. Musculoskeletal: Negative for gait problem, neck pain and neck stiffness. Skin: Positive for rash (hives to chest and abdomen). Negative for color change. Allergic/Immunologic: Negative for immunocompromised state. Neurological: Negative for speech difficulty and headaches. Hematological: Does not bruise/bleed easily. Psychiatric/Behavioral: Negative for confusion. All other systems reviewed and are negative. Vitals:    01/22/17 2054 01/22/17 2131   BP: 135/67    Pulse: (!) 115    Resp: 24    Temp: 99.3 °F (37.4 °C)    SpO2: 91% 94%   Weight: 84.1 kg (185 lb 8 oz)    Height: 5' 10\" (1.778 m)             Physical Exam   Constitutional: He is oriented to person, place, and time. He appears well-developed and well-nourished. No distress. HENT:   Head: Normocephalic and atraumatic. Right Ear: External ear normal.   Left Ear: External ear normal.   Nasal congestion   Eyes: EOM are normal. Pupils are equal, round, and reactive to light. Neck: Normal range of motion. Neck supple. No JVD present.  No tracheal deviation present. Cardiovascular: Normal rate, regular rhythm and normal heart sounds. Exam reveals no gallop and no friction rub. No murmur heard. Pulmonary/Chest: Effort normal. No stridor. No respiratory distress. He has wheezes (faint, expiratory bilaterally). He has no rales. O2 sats on 91% on RA and 96% on 2L O2   Abdominal: Soft. Bowel sounds are normal. He exhibits no distension. There is no tenderness. There is no rebound and no guarding. Musculoskeletal: Normal range of motion. He exhibits no edema or tenderness. No leg edema. Good pulses in all 4 extremities   Neurological: He is alert and oriented to person, place, and time. He has normal reflexes. No cranial nerve deficit. Coordination normal.   Skin: Skin is warm and dry. Rash noted. He is not diaphoretic. No erythema. Faint hives to chest bilaterally   Psychiatric: He has a normal mood and affect. His behavior is normal. Judgment and thought content normal.   Nursing note and vitals reviewed. Note written by Frantz Warner, as dictated by Annie Faith MD 9:06 PM     MDM  Number of Diagnoses or Management Options  Community acquired pneumonia:   Dehydration:   Hypoxia:   Wheezing:   Diagnosis management comments: 79-year-old white male presents to the emergency department with cough, congestion, wheezing. Patient's symptoms have been present for the last month. He has been on 2 rounds of antibiotics. Symptoms have continued. He also has some faint hives on his chest. We'll check basic blood work. We'll check chest x-ray. We'll give him nebulizer treatment. We'll give soluMedrol and Benadryl. We'll reassess when testing is back. Patient agrees with this plan.          Amount and/or Complexity of Data Reviewed  Clinical lab tests: ordered and reviewed  Tests in the radiology section of CPT®: ordered and reviewed  Tests in the medicine section of CPT®: ordered and reviewed  Discussion of test results with the performing providers: yes  Decide to obtain previous medical records or to obtain history from someone other than the patient: yes  Obtain history from someone other than the patient: yes  Review and summarize past medical records: yes  Discuss the patient with other providers: yes  Independent visualization of images, tracings, or specimens: yes    Risk of Complications, Morbidity, and/or Mortality  Presenting problems: high  Diagnostic procedures: high  Management options: high    Critical Care  Total time providing critical care: 30-74 minutes    Patient Progress  Patient progress: improved    ED Course       Procedures  ED EKG interpretation:  Rhythm: sinus tachycardia; and regular . Rate (approx.): 112; Axis: normal; ST/T wave: no elevations or depressions  Note written by Frantz Stoll, as dictated by Caitlyn Alba MD 9:06 PM     PROGRESS NOTE:  9:57 PM  Pt CXR reveals bilateral lower lobe pneumonias, will treat with levaquin and will reassess shortly for disposition. PROGRESS NOTE:  10:14 PM  Because of patient's hypoxia, wheezing, and pneumonia, will admit to hospitalist for further treatment and evaluation. Will consult hospitalist.    CONSULT NOTE:  10:17 PM Caitlyn Alba MD spoke with Dr. Robby Delgado, Consult for Hospitalist.  Discussed available diagnostic tests and clinical findings. Dr. Vernell Mcelroy will admit.     Total critical care time spend exclusive of procedures:  30 min

## 2017-01-23 NOTE — CDMP QUERY
Dr. Day Hurley,     The diagnosis of acute respiratory failure has been documented for your patient. Currently, the documentation does not meet criteria for this diagnosis. Based on the current documentation, the diagnosis of acute respiratory failure may be challenged by an external reviewer. Please remember to include the clinical indicators to support this diagnosis. Current Documentation:  Per ED notes: cough and shortness of breath  Per H&P: cough and shortness of breath, patient in no acute respiratory distress at rest.  RR 24, O2 sat 91% ra  ABGs: pH 7.384, pCO2 34, pO2 98, sO2 98 on n/c      Criteria:    1. Difficulty breathing: SOB, dyspnea, tachypnea (RR >/= 28), respiratory distress, cyanosis, use of accessory muscles, air hunger, inability to speak in full sentences    AND    2. Blood gas abnormality   pO2 < 60 or SpO2 </= 88%  while on room air   pCO2 > 50 & usually pH < 7.35    pO2 < 70 or SpO2 < 92% while on supplemental O2    pO2 < 80 or SpO2 < 95% with FiO2 > 32%   Requiring more than 40% FiO2, regardless of pO2/SpO2      Please remember to include the clinical indicators to support this diagnosis or document that diagnosis has been ruled out or meets criteria for Possible Acute Renal Insufficiency  Please clarify and document your clinical opinion in the progress notes and discharge summary including the definitive and/or presumptive diagnosis, (suspected or probable), related to the above clinical findings. Please include clinical findings supporting your diagnosis.     Thank you,   Roger Campos, [address] [facility]

## 2017-01-23 NOTE — PROGRESS NOTES
Zarina Jaeger MD   Phone/text: [phone] (7am to 7pm)  After 7pm please call  for hospitalist on call    Hospitalist Progress Note     1/23/2017   PCP:  Dr. Nima Lozano MD  Chief Complaint   Patient presents with    Chest Pain (Angina)    Shortness of Breath       Admission Summary:   Екатерина Ramirez is a 70 y.o. male with past medical history of bladder cancer, chronic kidney disease, anxiety, depression, type 2 diabetes melltius (DM), gout, hypertension, hyperlipidemia, stroke, TIA presented to the ED from home with reported chief complains of chest congestion, cough, shortness of breath, and chest pain. Reason For Visit:  Pneumonia    Assessment/Plan   Sepsis due to Pneumonia (POA) - community acquired with leukocytosis, tachycardia and tachypnea   - CXR 1/22 with bibasilar opacities  - Continue Levofloxacin started 1/22  - Bcx 1/22 no growth    Hypoxia (POA) - due to pneumonia  - Wean oxygen as able    Hives (POA) - patient was on cefdinir  - Benadryl prn  - Given 1 dose of solumedrol  - Cefdinir listed as allergy    Chest pain (POA) - likely secondary to persistent coughing/ and now pneumonia  - ECG with sinus, poor R-wave progression, non-specific ST changes  - Troponin negative  - Continue telemetry monitoring    Type 2 DM - last A1c 8.1   - Hold metformin  - Lispro SSI  - Check A1c    HTN - continue home lisinopril and amlodipine    Anxiety/depression - stable  - Patient is on citalopram and duloxetine as an outpatient, it is relatively contraindicated to be on an SSRI and an SNRI together. Hold duloxitine    See orders for other plans. VTE prophylaxis: enoxaparin  Discussed plan of care with Patient/Family, Nurse and    Pre-admission lived at home  Discharge plan: pending  Estimated time to discharge: 1-2 days     Subjective   Mr. Sylvain Osuna is feeling a bit better. Still coughing, non-productive. No chest pain now. Still with hives but not itching.  No lip or tongue swelling. No wheezing. Reviewed interval history    Physical examination     Visit Vitals    /52 (BP 1 Location: Right arm, BP Patient Position: At rest)    Pulse 70    Temp 98.4 °F (36.9 °C)    Resp 20    Ht 5' 10\" (1.778 m)    Wt 83 kg (182 lb 15.7 oz)    SpO2 97%    BMI 26.26 kg/m2       Temp (24hrs), Av.5 °F (36.9 °C), Min:97.9 °F (36.6 °C), Max:99.3 °F (37.4 °C)      Intake/Output Summary (Last 24 hours) at 17 1345  Last data filed at 17 0316   Gross per 24 hour   Intake              325 ml   Output                0 ml   Net              325 ml       Gen - NAD  ENT - MMM  Neck - supple, full ROM  CV - RRR, no MRG  Resp - lungs decreased at bases, normal WOB  GI - abdomen S, NT, ND, +BS  Ext - no edema  Skin - hives on chest, arms   Neuro - A&O, no focal deficits    Data Review     Telemetry x   ECG    Xray x   CT scan    MRI    Echocardiogram          I have reviewed the flow sheet and recent notes  New labs and data below personally reviewed.     Recent Labs      17   0247  17   2103   WBC  15.9*  12.1*   HGB  11.2*  12.3   HCT  34.0*  37.1   PLT  258  294     Recent Labs      17   0247  17   2103   NA  134*  133*   K  4.5  4.6   CL  101  98   CO2  21  27   GLU  255*  141*   BUN  12  12   CREA  1.34*  1.30   CA  8.7  9.4   ALB   --   3.3*   TBILI   --   0.5   SGOT   --   14*   ALT   --   17       Medications reviewed  Current Facility-Administered Medications   Medication Dose Route Frequency    amLODIPine (NORVASC) tablet 10 mg  10 mg Oral DAILY    aspirin chewable tablet 81 mg  81 mg Oral DAILY    citalopram (CELEXA) tablet 20 mg  20 mg Oral DAILY    DULoxetine (CYMBALTA) capsule 20 mg  20 mg Oral QHS    gabapentin (NEURONTIN) capsule 800 mg  800 mg Oral BID    lisinopril (PRINIVIL, ZESTRIL) tablet 20 mg  20 mg Oral DAILY    pantoprazole (PROTONIX) tablet 40 mg  40 mg Oral DAILY    sodium chloride (NS) flush 5-10 mL  5-10 mL IntraVENous Q8H    sodium chloride (NS) flush 5-10 mL  5-10 mL IntraVENous PRN    enoxaparin (LOVENOX) injection 40 mg  40 mg SubCUTAneous Q24H    acetaminophen (TYLENOL) tablet 650 mg  650 mg Oral Q6H PRN    diphenhydrAMINE (BENADRYL) injection 25 mg  25 mg IntraVENous Q6H PRN    0.9% sodium chloride infusion  125 mL/hr IntraVENous CONTINUOUS    levoFLOXacin (LEVAQUIN) 750 mg in D5W IVPB  750 mg IntraVENous Q24H         Tg Cardona MD  Internal Medicine  1/23/2017

## 2017-01-23 NOTE — PROGRESS NOTES
Problem: Falls - Risk of  Goal: *Absence of falls  Outcome: Progressing Towards Goal  Pt encouraged to call for assistance. Call bell, tray table, and telephone kept within reach while in bed. Pt reminded of safety concerns with equipment. Problem: Pressure Ulcer - Risk of  Goal: *Prevention of pressure ulcer  Outcome: Progressing Towards Goal  Skin kept clean and dry. Pt encouraged to be out of bed and to change position while in bed. Problem: Pneumonia: Day 1  Goal: Consults, if ordered  Outcome: Progressing Towards Goal  Respiratory educator consult. Goal: Nutrition/Diet  Outcome: Progressing Towards Goal  Pt with good appetite. Tolerating diabetic diet. Goal: Respiratory  Outcome: Progressing Towards Goal  Lungs with crackles in bases. Goal: *Oxygen saturation within defined limits  Outcome: Progressing Towards Goal  O2 over 95% on room air. O2 sats drops to 85%  On room air post activity.

## 2017-01-23 NOTE — PROGRESS NOTES
CM reviewed chart. Patient was admitted with pneumonia, she has a past medical history of bladder cancer, chronic kidney disease, anxiety, depression, type 2 diabetes mellitus (DM), gout, hypertension, hyperlipidemia, stroke, TIA. CM met with patient, explained role and discussed discharge planning. Patient lives with his wife in his private residence. He is independent without any assistive devices. He sees his PCP as needed and uses his local [address] foe prescriptions. His wife will provide transportation home. He did not voice any discharge barriers. Patient will be discharged home in care of his wife. Care Management Interventions  PCP Verified by CM: Yes (Dr. Halley Singh)  Mode of Transport at Discharge:  Other (see comment) (Private car)  Transition of Care Consult (CM Consult): Discharge Planning  MyChart Signup: No  Discharge Durable Medical Equipment: No  Health Maintenance Reviewed: Yes  Physical Therapy Consult: No  Occupational Therapy Consult: No  Speech Therapy Consult: No  Current Support Network: Lives with Spouse  Confirm Follow Up Transport: Family  Plan discussed with Pt/Family/Caregiver: Yes  Discharge Location  Discharge Placement: Home with family assistance

## 2017-01-23 NOTE — ROUTINE PROCESS
TRANSFER - OUT REPORT:    Verbal report given to [facility]) on Esperanza Salmon  being transferred to [facility](unit) for routine progression of care       Report consisted of patients Situation, Background, Assessment and   Recommendations(SBAR). Information from the following report(s) SBAR, Kardex, ED Summary and MAR was reviewed with the receiving nurse. Lines:   Peripheral IV 01/22/17 Left Antecubital (Active)   Site Assessment Clean, dry, & intact 1/22/2017  9:30 PM   Phlebitis Assessment 0 1/22/2017  9:30 PM   Infiltration Assessment 0 1/22/2017  9:30 PM   Dressing Status Clean, dry, & intact 1/22/2017  9:30 PM       Peripheral IV 01/22/17 Left Forearm (Active)   Site Assessment Clean, dry, & intact 1/22/2017  9:30 PM   Phlebitis Assessment 0 1/22/2017  9:30 PM   Infiltration Assessment 0 1/22/2017  9:30 PM   Dressing Status Clean, dry, & intact 1/22/2017  9:30 PM        Opportunity for questions and clarification was provided.       Patient transported with:   Registered Nurse

## 2017-01-23 NOTE — PROGRESS NOTES
TRANSFER - OUT REPORT:    Verbal report given to LEILA lehman(name) on Roxi Hester  being transferred to (unit) for routine progression of care       Report consisted of patients Situation, Background, Assessment and   Recommendations(SBAR). Information from the following report(s) SBAR, Kardex, MAR, Recent Results and Med Rec Status was reviewed with the receiving nurse. Lines:   Peripheral IV 01/22/17 Left Antecubital (Active)   Site Assessment Clean, dry, & intact 1/23/2017  3:16 AM   Phlebitis Assessment 0 1/23/2017  3:16 AM   Infiltration Assessment 0 1/23/2017  3:16 AM   Dressing Status Clean, dry, & intact 1/23/2017  3:16 AM   Dressing Type Transparent 1/23/2017  3:16 AM   Hub Color/Line Status Pink;Capped 1/23/2017  3:16 AM   Action Taken Open ports on tubing capped 1/23/2017  3:16 AM   Alcohol Cap Used Yes 1/23/2017  3:16 AM       Peripheral IV 01/22/17 Left Forearm (Active)   Site Assessment Clean, dry, & intact 1/23/2017  3:16 AM   Phlebitis Assessment 0 1/23/2017  3:16 AM   Infiltration Assessment 0 1/23/2017  3:16 AM   Dressing Status Clean, dry, & intact 1/23/2017  3:16 AM   Dressing Type Transparent 1/23/2017  3:16 AM   Hub Color/Line Status Pink; Infusing 1/23/2017  3:16 AM   Action Taken Open ports on tubing capped 1/23/2017  3:16 AM   Alcohol Cap Used Yes 1/23/2017  3:16 AM        Opportunity for questions and clarification was provided.       Patient transported with:   O2 @ 2 liters  Tech

## 2017-01-23 NOTE — H&P
History & Physical    Date of admission: 1/22/2017    Patient name: Michelle Lopez  MRN: [id number]  YOB: 1945  Age: 70 y.o. Primary care provider:  Michelle Cortés MD     Source of Information: patient, patient's wife, ED/ and medical records                                  Chief complaint:  Chest congestion, cough, shortness of breath, chest pain    History of present illness  Michelle Lopez is a 70 y.o. male with past medical history of bladder cancer, chronic kidney disease, anxiety, depression, type 2 diabetes melltius (DM), gout, hypertension, hyperlipidemia, stroke, TIA presented to the ED from home with reported chief complains of chest congestion, cough, shortness of breath, and chest pain. Symptom onset reportedly started ~ 2 months ago, worsened over last few days. Patient has multiple complaints including cough productive of yellow sputum, shortness of breath, chest pain with coughing, fever, chills, sinus pressure, rhinorrhea, headache, and intractable dizziness (ongoing for last 8-9 months). Patient has not reportedly seen ENT for intractable dizziness symptom in the past ~ 9 months. There were no reports of new onset syncope, loss of consciousness, neck pain, visual disturbance, numbness, paresthesias, focal weakness, palpitations, abdominal pain, nausea, vomiting, diarrhea, melena, dysuria, hematuria, calf pain, increased leg swelling/ edema. Patient had been seen his PCP for aforementioned symptoms. Patient has been on repeat antibiotic courses of treatment still with no improvement. Patient had hives earlier in the day with some mild itching but no difficulty with breathing/ swallowing. Patient's wife notes that she thinks that she may have \"picked up the wrong medicine\". Patient reportedly took Mucinex and another unknown antibiotic (which was not Cefdinir that he has been taking for the past week). On arrival in the ED, initial recorded vital signs were BP= 135/67, HR= 115, RR= 24, O2sat= 91% on room air. Patient was placed on 2 liters O2 via nasal cannula (NC). Patient remains hypoxic. Chest xray showed pneumonia. Patient was started on Levofloxacin IV. Patient is now seen for admission to the hospitalist service for continued evaluation and treatments. Past Medical History   Diagnosis Date    Bladder cancer ([facility] [address] 2010    Cancer [facility])      bladder    Chronic kidney disease      reduced kidney function    Depression     Diabetes ([facility] [address]     Gout     Hypercholesterolemia     Hypertension     Kidney function abnormal     Stroke ([facility] [address]      TIA    Wears dentures       Past Surgical History   Procedure Laterality Date    Hx colonoscopy  2014    Hx orthopaedic       right shoulder rotator cuff    Hx orthopaedic       left knee repair    Hx cataract removal      Hx retinal detachment repair       Prior to Admission medications    Medication Sig Start Date End Date Taking? Authorizing Provider   aspirin 81 mg chewable tablet Take 1 Tab by mouth daily. Indications: TRANSIENT CEREBRAL ISCHEMIA 8/17/16   LEI Tian, Physical therapy to eval and treat 8/16/16   Josette Mcdowell NP   allopurinol (ZYLOPRIM) 300 mg tablet Take 1 tablet by mouth two (2) times a day. For gout prevention 9/17/14   Lora Burkitt, NP   amLODIPine (NORVASC) 10 mg tablet Take 1 Tab by mouth daily. 7/14/14   Tristin Rios MD   gabapentin (NEURONTIN) 800 mg tablet TAKE TWO TABLETS BY MOUTH TWICE DAILY 7/10/14   Tristin Rios MD   DULoxetine (CYMBALTA) 20 mg capsule Take 20 mg by mouth nightly. Historical Provider   lisinopril (PRINIVIL, ZESTRIL) 20 mg tablet Take 1 Tab by mouth daily. 3/5/14   Tristin Rios MD   pantoprazole (PROTONIX) 40 mg tablet Take 1 Tab by mouth daily. 10/18/13   Tristin Rios MD   citalopram (CELEXA) 40 mg tablet Take 1 Tab by mouth daily.  10/9/13   Tristin Rios MD meloxicam (MOBIC) 15 mg tablet Take 1 Tab by mouth daily. 10/8/13   Allen Carl MD   multivitamins-minerals-lutein (CENTRUM SILVER) Tab Take 1 Tab by mouth daily. 10/9/13   Allen Carl MD     Allergies   Allergen Reactions    Crestor [Rosuvastatin] Myalgia      Family History   Problem Relation Age of Onset    Heart Disease Mother     Heart Disease Father       Social history  Patient resides  x  Independently / [place]               Ambulates  x  Independently                 Alcohol history   x  patient denies           Smoking history      x  Former smoker         History   Smoking Status    Former Smoker   Smokeless Tobacco    Never Used       Code status  x  Full code       Review of systems  I performed an 11 systems review; pertinent positives were as noted in HPI, otherwise negative. Physical Examination   Visit Vitals    /67 (BP 1 Location: Left arm, BP Patient Position: At rest)    Pulse (!) 115    Temp 99.3 °F (37.4 °C)    Resp 24    Ht 5' 10\" (1.778 m)    Wt 84.1 kg (185 lb 8 oz)    SpO2 94%    BMI 26.62 kg/m2      O2 Flow Rate (L/min): 2 l/min   O2 Device: Nasal cannula    General:  Patient in no acute respiratory distress at rest   Head:  Normocephalic, without obvious abnormality, atraumatic   Eyes:  Conjunctivae/corneas clear. PERRL, EOMs intact   E/N/M/T: Nares normal. Septum midline.  No nasal drainage or sinus tenderness  Lips, mucosa, and tongue normal   Teeth and gums normal  Clear oropharynx   Neck: Normal appearance and movements, symmetrical, trachea midline  No palpable adenopathy  No thyroid enlargement, tenderness or nodules  No carotid bruit   Normal JVD   Lungs:   Symmetrical chest expansion and respiratory effort  Coarse rales to auscultation bilaterally   Chest wall:  No tenderness or deformity   Heart:  Tachycardic  Regular rhythm   Sounds normal; no murmur, click, rub or gallop   Abdomen:   Soft, no tenderness  No rebound, guarding, or rigidity  Non-distended  Bowel sounds normal  No masses or hepatosplenomegaly  No hernias present   Back: No CVA tenderness   Extremities: Extremities normal, atraumatic  No cyanosis, clubbing, or edema  No DVT signs   Pulses 2+ radial/ 1+ DP bilateral pulses   Skin: Urticaria involving neck, chest, abdominal wall, back, and BUE  Warm/ dry   Musculo-      skeletal: Gait not tested  Normal symmetry, ROM, strength and tone  No calf tenderness   Neuro: GCS 15. Moves all extremities x 4. No slurred speech. No facial droop. Sensation grossly intact. No pronator drift   Psych: Alert, oriented x 3  Slightly anxious         Data Review    24 Hour Results:  Recent Results (from the past 24 hour(s))   EKG, 12 LEAD, INITIAL    Collection Time: 01/22/17  8:47 PM   Result Value Ref Range    Ventricular Rate 112 BPM    Atrial Rate 112 BPM    P-R Interval 146 ms    QRS Duration 92 ms    Q-T Interval 328 ms    QTC Calculation (Bezet) 447 ms    Calculated P Axis 41 degrees    Calculated R Axis -6 degrees    Calculated T Axis 85 degrees    Diagnosis       Sinus tachycardia  When compared with ECG of 16-AUG-2016 00:25,  Vent. rate has increased BY  54 BPM  Inverted T waves have replaced nonspecific T wave abnormality in Lateral   leads     CBC WITH AUTOMATED DIFF    Collection Time: 01/22/17  9:03 PM   Result Value Ref Range    WBC 12.1 (H) 4.1 - 11.1 K/uL    RBC 3.88 (L) 4.10 - 5.70 M/uL    HGB 12.3 12.1 - 17.0 g/dL    HCT 37.1 36.6 - 50.3 %    MCV 95.6 80.0 - 99.0 FL    MCH 31.7 26.0 - 34.0 PG    MCHC 33.2 30.0 - 36.5 g/dL    RDW 13.6 11.5 - 14.5 %    PLATELET 312 489 - 719 K/uL    NEUTROPHILS 81 (H) 32 - 75 %    LYMPHOCYTES 7 (L) 12 - 49 %    MONOCYTES 8 5 - 13 %    EOSINOPHILS 4 0 - 7 %    BASOPHILS 0 0 - 1 %    ABS. NEUTROPHILS 9.7 (H) 1.8 - 8.0 K/UL    ABS. LYMPHOCYTES 0.9 0.8 - 3.5 K/UL    ABS. MONOCYTES 1.0 0.0 - 1.0 K/UL    ABS. EOSINOPHILS 0.5 (H) 0.0 - 0.4 K/UL    ABS.  BASOPHILS 0.0 0.0 - 0.1 K/UL   METABOLIC PANEL, COMPREHENSIVE    Collection Time: 01/22/17  9:03 PM   Result Value Ref Range    Sodium 133 (L) 136 - 145 mmol/L    Potassium 4.6 3.5 - 5.1 mmol/L    Chloride 98 97 - 108 mmol/L    CO2 27 21 - 32 mmol/L    Anion gap 8 5 - 15 mmol/L    Glucose 141 (H) 65 - 100 mg/dL    BUN 12 6 - 20 MG/DL    Creatinine 1.30 0.70 - 1.30 MG/DL    BUN/Creatinine ratio 9 (L) 12 - 20      GFR est AA >60 >60 ml/min/1.73m2    GFR est non-AA 54 (L) >60 ml/min/1.73m2    Calcium 9.4 8.5 - 10.1 MG/DL    Bilirubin, total 0.5 0.2 - 1.0 MG/DL    ALT 17 12 - 78 U/L    AST 14 (L) 15 - 37 U/L    Alk. phosphatase 112 45 - 117 U/L    Protein, total 7.4 6.4 - 8.2 g/dL    Albumin 3.3 (L) 3.5 - 5.0 g/dL    Globulin 4.1 (H) 2.0 - 4.0 g/dL    A-G Ratio 0.8 (L) 1.1 - 2.2     CK W/ REFLX CKMB    Collection Time: 01/22/17  9:03 PM   Result Value Ref Range    CK 41 39 - 308 U/L   TROPONIN I    Collection Time: 01/22/17  9:03 PM   Result Value Ref Range    Troponin-I, Qt. <0.04 <0.05 ng/mL   LACTIC ACID, PLASMA    Collection Time: 01/22/17  9:19 PM   Result Value Ref Range    Lactic acid 1.5 0.4 - 2.0 MMOL/L     Recent Labs      01/22/17 2103   WBC  12.1*   HGB  12.3   HCT  37.1   PLT  294     Recent Labs      01/22/17 2103   NA  133*   K  4.6   CL  98   CO2  27   GLU  141*   BUN  12   CREA  1.30   CA  9.4   ALB  3.3*   TBILI  0.5   SGOT  14*   ALT  17       Imaging  Chest xray PA/Lateral:  COMPARISON: 10/21/2013     FINDINGS: PA and lateral views of the chest demonstrate a stable  cardiomediastinal silhouette and bibasilar opacities. There is chronic  cardiomegaly. The visualized osseous structures are unremarkable.   IMPRESSION: Bibasilar opacities consistent with pneumonia. Assessment and Plan   1. Pneumonia- community acquired. -  Admit to telemetry      - continue Levofloxacin for IV antibiotic coverage      - check blood culture results  2. Acute hypoxic respiratory failure.        - increased O2 to 3 liters via NC during exam      - TITRATE O2 TO KEEP O2SATS > 94% IMMEDIATELY NOTIFY MD IF O2 INCREASED > 4      LITERS. 3.   Hives       -  Benadryl 25 mg IV q 6 hours prn      -   Solumedrol 40 mg IV q 6 hours  4. Chest pain. Likely secondary to persistent coughing/ and now pneumonia        -  Continue telemetry monitoring        -   Order Troponin level  5. Fever         - check blood culture results        -  Tylenol prn  6. Leukocytosis       -   Repeat CBC in a.m.  7.  Sinus tachycardia. Unresolved. - again continue telemetry monitoring  8. Type 2 DM       - Order Humalog insulin correctional coverage, scheduled blood glucose checks and check hemoglobin A1c level. 9.  Anxiety         -  Continue home medication  10. Depression         - plan as noted  11. VTE prophylaxis.            - SCDs to BLEs                 Signed by: Rita José MD    January 22, 2017 at 11:55 PM

## 2017-01-23 NOTE — PROGRESS NOTES
Respiratory Educator    Pneumonia education provided to this 79yo male with admit DX of pneumonia. See physician's notes    S: \"Jany had this sinus drainage for a while and think this is what started the whole thing\". O/A: Pt awake and alert in bed. On O2via NC at 2lpm. O2 Sats:97%, RR: 16, HR: 88. BS: aeration t/o with faint exp. Wheezes bilaterally. Cough is non-productive at this time. Pt. States he occasionally coughs up yellow secretions in small amounts. Pt states no home respiratory medications. States he has no history of pulmonary or respiratory problems. Non-smoker. States only issue has been his sinus drainage. P: Pneumonia packet provided. 1. Explained reason for pneumonia education: to prevent readmission for the same  2. Reviewed pneumonia and it's causes  3. Reviewed what occurs in the lungs with pneumonia  4. Reviewed airway clearance techniques. IS instructed. Pt. Performed IS maneuver achieving 1275cc consistently X 7.  5. Discussed importance of f/up with Dr after discharge  6. Discussed importance of adhering to Drs orders  7. Discussed importance of limiting his exposure to and protection from possible triggers such as extreme cold weather by proper clothing. 8. Discussed pneumonia telephone f/up program and agreed to participate    Thank you for the privilege of providing pneumonia prevention instruction to your patient.     Cristopher Cruz, RRT  Respiratory Care  Pulmonary Disease Case Manager

## 2017-01-23 NOTE — PROGRESS NOTES
TRANSFER - IN REPORT:    Verbal report received from [facility] (name) on Unk Havers  being received from ED (unit) for routine progression of care      Report consisted of patients Situation, Background, Assessment and   Recommendations(SBAR). Information from the following report(s) SBAR, Kardex, Procedure Summary, Intake/Output, MAR, Recent Results and Cardiac Rhythm NSR was reviewed with the receiving nurse. Opportunity for questions and clarification was provided. Assessment completed upon patients arrival to unit and care assumed.

## 2017-01-23 NOTE — PROGRESS NOTES
TRANSFER - IN REPORT:    Verbal report received from [address] [place] [address],[address] (name) on Angela Rivero  being received from [facility] (unit) for routine progression of care      Report consisted of patients Situation, Background, Assessment and   Recommendations(SBAR). Information from the following report(s) SBAR, Kardex, Intake/Output, Recent Results and Med Rec Status was reviewed with the receiving nurse. Opportunity for questions and clarification was provided. Assessment completed upon patients arrival to unit and care assumed.

## 2017-01-23 NOTE — PROGRESS NOTES
Primary Nurse Cally Anna RN and Ruben Ruiz RN performed a dual skin assessment on this patient No impairment noted  Campbell score is 22

## 2017-01-23 NOTE — PROGRESS NOTES
Bedside shift change report given to sAhlyn (oncoming nurse) by Antonio Cadet (offgoing nurse). Report included the following information SBAR, Kardex, Procedure Summary, Intake/Output, MAR and Recent Results.

## 2017-01-23 NOTE — PROGRESS NOTES
1010: Spoke with pt's wife rt physician's question last night of pt's PTA abx that was not effective. Wife states the prescription was Amoxicillin 875mg BID. Message relayed to primary RN to pass on to physician.

## 2017-01-23 NOTE — ED TRIAGE NOTES
Triage note: pt reports he has been sick for the past since before [place] - cough, cold,congestion.  He has been on two different antibiotics - currently on Cefdinir - 1/13 Pt with hives all over chest.

## 2017-01-23 NOTE — PROGRESS NOTES
Primary Nurse Ally Esquivel RN and James Sanches RN performed a dual skin assessment on this patient No impairment noted  Campbell score is 21      Hives on abdomen and arms.  Present on arrival to hospital.

## 2017-01-23 NOTE — INTERDISCIPLINARY ROUNDS
IDR/SLIDR Summary          Patient: Pernell Quarles MRN: [id number]    Age: 70 y.o. YOB: 1945 Room/Bed: [facility]/   Admit Diagnosis: Pneumonia  Principal Diagnosis: Pneumonia   Goals: breathe easier  Readmission: NO  Quality Measure: PNA  VTE Prophylaxis: Chemical  Influenza Vaccine screening completed? YES  Mobility needs: No   Nutrition plan:No  Consults: none needed    Financial concerns:No  Escalated to CM? NO  RRAT Score: 22   Interventions:H2H  Testing due for pt today? NO  LOS: 1 days Expected length of stay Not listed  Discharge plan: Home when able   PCP: Peyman Gilman MD  Transportation needs: No    Days before discharge:one day until discharge  and can be transferred off telemetry  Discharge disposition: Home    Interdisciplinary team rounds were held 1/23/2017 with the following team members:Care Management, Nursing and Nutrition and the patient. Plan of care discussed. See clinical pathway and/or care plan for interventions and desired outcomes. Signed:      Cally Anna RN  1/23/2017  5:45 AM

## 2017-01-24 LAB
EST. AVERAGE GLUCOSE BLD GHB EST-MCNC: 157 MG/DL
GLUCOSE BLD STRIP.AUTO-MCNC: 119 MG/DL (ref 65–100)
GLUCOSE BLD STRIP.AUTO-MCNC: 128 MG/DL (ref 65–100)
GLUCOSE BLD STRIP.AUTO-MCNC: 131 MG/DL (ref 65–100)
GLUCOSE BLD STRIP.AUTO-MCNC: 155 MG/DL (ref 65–100)
HBA1C MFR BLD: 7.1 % (ref 4.2–6.3)
SERVICE CMNT-IMP: ABNORMAL

## 2017-01-24 PROCEDURE — 87077 CULTURE AEROBIC IDENTIFY: CPT | Performed by: HOSPITALIST

## 2017-01-24 PROCEDURE — 82962 GLUCOSE BLOOD TEST: CPT

## 2017-01-24 PROCEDURE — 94640 AIRWAY INHALATION TREATMENT: CPT

## 2017-01-24 PROCEDURE — 74011250636 HC RX REV CODE- 250/636: Performed by: FAMILY MEDICINE

## 2017-01-24 PROCEDURE — 36415 COLL VENOUS BLD VENIPUNCTURE: CPT | Performed by: HOSPITALIST

## 2017-01-24 PROCEDURE — 74011250637 HC RX REV CODE- 250/637: Performed by: FAMILY MEDICINE

## 2017-01-24 PROCEDURE — 74011000250 HC RX REV CODE- 250: Performed by: HOSPITALIST

## 2017-01-24 PROCEDURE — 74011636637 HC RX REV CODE- 636/637: Performed by: HOSPITALIST

## 2017-01-24 PROCEDURE — 65270000029 HC RM PRIVATE

## 2017-01-24 PROCEDURE — 83036 HEMOGLOBIN GLYCOSYLATED A1C: CPT | Performed by: HOSPITALIST

## 2017-01-24 PROCEDURE — 74011250637 HC RX REV CODE- 250/637: Performed by: HOSPITALIST

## 2017-01-24 PROCEDURE — 77030029684 HC NEB SM VOL KT MONA -A

## 2017-01-24 PROCEDURE — 87070 CULTURE OTHR SPECIMN AEROBIC: CPT | Performed by: HOSPITALIST

## 2017-01-24 RX ORDER — ACETYLCYSTEINE 200 MG/ML
400 SOLUTION ORAL; RESPIRATORY (INHALATION)
Status: DISCONTINUED | OUTPATIENT
Start: 2017-01-24 | End: 2017-01-26 | Stop reason: HOSPADM

## 2017-01-24 RX ORDER — IPRATROPIUM BROMIDE AND ALBUTEROL SULFATE 2.5; .5 MG/3ML; MG/3ML
3 SOLUTION RESPIRATORY (INHALATION)
Status: DISCONTINUED | OUTPATIENT
Start: 2017-01-24 | End: 2017-01-26 | Stop reason: HOSPADM

## 2017-01-24 RX ORDER — ZOLPIDEM TARTRATE 5 MG/1
5 TABLET ORAL
Status: DISCONTINUED | OUTPATIENT
Start: 2017-01-24 | End: 2017-01-26 | Stop reason: HOSPADM

## 2017-01-24 RX ADMIN — INSULIN LISPRO 2 UNITS: 100 INJECTION, SOLUTION INTRAVENOUS; SUBCUTANEOUS at 12:03

## 2017-01-24 RX ADMIN — ACETYLCYSTEINE 400 MG: 200 SOLUTION ORAL; RESPIRATORY (INHALATION) at 20:13

## 2017-01-24 RX ADMIN — GUAIFENESIN 600 MG: 600 TABLET, EXTENDED RELEASE ORAL at 21:50

## 2017-01-24 RX ADMIN — ACETYLCYSTEINE 400 MG: 200 SOLUTION ORAL; RESPIRATORY (INHALATION) at 14:12

## 2017-01-24 RX ADMIN — AMLODIPINE BESYLATE 10 MG: 5 TABLET ORAL at 09:24

## 2017-01-24 RX ADMIN — CITALOPRAM HYDROBROMIDE 20 MG: 20 TABLET ORAL at 09:24

## 2017-01-24 RX ADMIN — GABAPENTIN 800 MG: 400 CAPSULE ORAL at 09:24

## 2017-01-24 RX ADMIN — LISINOPRIL 20 MG: 20 TABLET ORAL at 09:24

## 2017-01-24 RX ADMIN — Medication 10 ML: at 07:21

## 2017-01-24 RX ADMIN — Medication 10 ML: at 14:17

## 2017-01-24 RX ADMIN — IPRATROPIUM BROMIDE AND ALBUTEROL SULFATE 3 ML: .5; 3 SOLUTION RESPIRATORY (INHALATION) at 20:13

## 2017-01-24 RX ADMIN — GUAIFENESIN 600 MG: 600 TABLET, EXTENDED RELEASE ORAL at 09:24

## 2017-01-24 RX ADMIN — SODIUM CHLORIDE 125 ML/HR: 900 INJECTION, SOLUTION INTRAVENOUS at 11:00

## 2017-01-24 RX ADMIN — DIPHENHYDRAMINE HYDROCHLORIDE 25 MG: 50 INJECTION, SOLUTION INTRAMUSCULAR; INTRAVENOUS at 02:46

## 2017-01-24 RX ADMIN — ZOLPIDEM TARTRATE 5 MG: 5 TABLET, FILM COATED ORAL at 22:03

## 2017-01-24 RX ADMIN — ASPIRIN 81 MG CHEWABLE TABLET 81 MG: 81 TABLET CHEWABLE at 09:24

## 2017-01-24 RX ADMIN — GABAPENTIN 800 MG: 400 CAPSULE ORAL at 17:49

## 2017-01-24 RX ADMIN — PANTOPRAZOLE SODIUM 40 MG: 40 TABLET, DELAYED RELEASE ORAL at 09:24

## 2017-01-24 RX ADMIN — LEVOFLOXACIN 750 MG: 5 INJECTION, SOLUTION INTRAVENOUS at 22:04

## 2017-01-24 RX ADMIN — IPRATROPIUM BROMIDE AND ALBUTEROL SULFATE 3 ML: .5; 3 SOLUTION RESPIRATORY (INHALATION) at 14:13

## 2017-01-24 RX ADMIN — Medication 10 ML: at 23:22

## 2017-01-24 RX ADMIN — SODIUM CHLORIDE 125 ML/HR: 900 INJECTION, SOLUTION INTRAVENOUS at 05:21

## 2017-01-24 NOTE — ROUTINE PROCESS
TRANSFER - OUT REPORT:    Verbal report given to Home Moe on Mis Gonzalez  being transferred to [address] [place] [address] for routine progression of care       Report consisted of patients Situation, Background, Assessment and   Recommendations(SBAR).      Information from the following report(s) SBAR was reviewed with the receiving     Patient transported with:   kooldiner

## 2017-01-24 NOTE — PROGRESS NOTES
Bedside and Verbal shift change report given to [facility] KHAI RN (oncoming nurse) by Latanya Cr RN (offgoing nurse). Report included the following information SBAR, Kardex, Intake/Output, Recent Results and Med Rec Status.

## 2017-01-24 NOTE — PROGRESS NOTES
Bedside and Verbal shift change report given to Deborah (oncoming nurse) by Linda Billy (offgoing nurse). Report included the following information SBAR, Kardex and MAR.

## 2017-01-24 NOTE — PROGRESS NOTES
Jitendra Solano MD   Phone/text: [phone] (7am to 7pm)  After 7pm please call  for hospitalist on call    Hospitalist Progress Note     1/24/2017   PCP:  Dr. Alexandria Buck MD  Chief Complaint   Patient presents with    Chest Pain (Angina)    Shortness of Breath       Admission Summary:   Anila Washington is a 70 y.o. male with past medical history of bladder cancer, chronic kidney disease, anxiety, depression, type 2 diabetes melltius (DM), gout, hypertension, hyperlipidemia, stroke, TIA presented to the ED from home with reported chief complains of chest congestion, cough, shortness of breath, and chest pain. Reason For Visit:  Pneumonia    Assessment/Plan   Sepsis due to Pneumonia (POA) - community acquired with leukocytosis, tachycardia and tachypnea. Improving and starting to cough up more sputum  - CXR 1/22 with bibasilar opacities  - Bcx 1/22 no growth  - Obtain sputum culture  - Add mucolytics and nebs  - Continue Levofloxacin started 1/22    Hypoxia (POA) - due to pneumonia  - Wean oxygen as able    Hives (POA) - patient was on cefdinir, improving  - Benadryl prn  - Given 1 dose of solumedrol  - Cefdinir listed as allergy    Chest pain (POA) - likely secondary to persistent coughing and pneumonia. Improved  - ECG with sinus, poor R-wave progression, non-specific ST changes  - Troponin negative  - Continue telemetry monitoring    Type 2 DM - A1c 7.1, BG acceptable  - Hold metformin  - Lispro SSI    HTN - continue home lisinopril and amlodipine    Anxiety/depression - stable  - Patient is on citalopram and duloxetine as an outpatient, it is relatively contraindicated to be on an SSRI and an SNRI together. Hold duloxitine    See orders for other plans. VTE prophylaxis: enoxaparin  Discussed plan of care with Patient/Family, Nurse and    Pre-admission lived at home  Discharge plan: to home  Estimated time to discharge: 1-2 days     Subjective   Mr. Kirti Rader is feeling better but starting to cough up more sputum. He notes a small amount of blood tinging and largely clear sputum. No chest pain. No SOB with oxygen on. Reviewed interval history    Physical examination     Visit Vitals    /68 (BP 1 Location: Right arm, BP Patient Position: At rest)    Pulse 67    Temp 98 °F (36.7 °C)    Resp 18    Ht 5' 10\" (1.778 m)    Wt 83 kg (182 lb 15.7 oz)    SpO2 94%    BMI 26.26 kg/m2       Temp (24hrs), Av.1 °F (36.7 °C), Min:97.9 °F (36.6 °C), Max:98.4 °F (36.9 °C)      Intake/Output Summary (Last 24 hours) at 17 0759  Last data filed at 17 0220   Gross per 24 hour   Intake          1635.42 ml   Output             1400 ml   Net           235.42 ml       Gen - NAD  ENT - MMM  Neck - supple, full ROM  CV - RRR, no MRG  Resp - lungs decreased at bases, normal WOB  GI - abdomen S, NT, ND, +BS  Ext - no edema  Skin - hives on chest, arms nearly gone  Neuro - A&O, no focal deficits    Data Review     Telemetry    ECG    Xray    CT scan    MRI    Echocardiogram          I have reviewed the flow sheet and recent notes  New labs and data below personally reviewed.     Recent Labs      17   0247  17   2103   WBC  15.9*  12.1*   HGB  11.2*  12.3   HCT  34.0*  37.1   PLT  258  294     Recent Labs      17   0247  17   2103   NA  134*  133*   K  4.5  4.6   CL  101  98   CO2  21  27   GLU  255*  141*   BUN  12  12   CREA  1.34*  1.30   CA  8.7  9.4   ALB   --   3.3*   TBILI   --   0.5   SGOT   --   14*   ALT   --   17       Medications reviewed  Current Facility-Administered Medications   Medication Dose Route Frequency    albuterol-ipratropium (DUO-NEB) 2.5 MG-0.5 MG/3 ML  3 mL Nebulization TID RT    acetylcysteine (MUCOMYST) 200 mg/mL (20 %) solution 400 mg  400 mg Nebulization TID RT    amLODIPine (NORVASC) tablet 10 mg  10 mg Oral DAILY    aspirin chewable tablet 81 mg  81 mg Oral DAILY    citalopram (CELEXA) tablet 20 mg  20 mg Oral DAILY  gabapentin (NEURONTIN) capsule 800 mg  800 mg Oral BID    lisinopril (PRINIVIL, ZESTRIL) tablet 20 mg  20 mg Oral DAILY    pantoprazole (PROTONIX) tablet 40 mg  40 mg Oral DAILY    sodium chloride (NS) flush 5-10 mL  5-10 mL IntraVENous Q8H    sodium chloride (NS) flush 5-10 mL  5-10 mL IntraVENous PRN    guaiFENesin SR (MUCINEX) tablet 600 mg  600 mg Oral Q12H    insulin lispro (HUMALOG) injection   SubCUTAneous AC&HS    glucose chewable tablet 16 g  4 Tab Oral PRN    dextrose (D50W) injection syrg 12.5-25 g  12.5-25 g IntraVENous PRN    glucagon (GLUCAGEN) injection 1 mg  1 mg IntraMUSCular PRN    acetaminophen (TYLENOL) tablet 650 mg  650 mg Oral Q6H PRN    diphenhydrAMINE (BENADRYL) injection 25 mg  25 mg IntraVENous Q6H PRN    0.9% sodium chloride infusion  125 mL/hr IntraVENous CONTINUOUS    levoFLOXacin (LEVAQUIN) 750 mg in D5W IVPB  750 mg IntraVENous Q24H         Obed Warren MD  Internal Medicine  1/24/2017

## 2017-01-25 LAB
ANION GAP BLD CALC-SCNC: 10 MMOL/L (ref 5–15)
BUN SERPL-MCNC: 19 MG/DL (ref 6–20)
BUN/CREAT SERPL: 18 (ref 12–20)
CALCIUM SERPL-MCNC: 8.3 MG/DL (ref 8.5–10.1)
CHLORIDE SERPL-SCNC: 105 MMOL/L (ref 97–108)
CO2 SERPL-SCNC: 23 MMOL/L (ref 21–32)
CREAT SERPL-MCNC: 1.05 MG/DL (ref 0.7–1.3)
ERYTHROCYTE [DISTWIDTH] IN BLOOD BY AUTOMATED COUNT: 13.6 % (ref 11.5–14.5)
GLUCOSE BLD STRIP.AUTO-MCNC: 111 MG/DL (ref 65–100)
GLUCOSE BLD STRIP.AUTO-MCNC: 119 MG/DL (ref 65–100)
GLUCOSE BLD STRIP.AUTO-MCNC: 148 MG/DL (ref 65–100)
GLUCOSE BLD STRIP.AUTO-MCNC: 149 MG/DL (ref 65–100)
GLUCOSE SERPL-MCNC: 100 MG/DL (ref 65–100)
HCT VFR BLD AUTO: 27.7 % (ref 36.6–50.3)
HGB BLD-MCNC: 9.3 G/DL (ref 12.1–17)
MCH RBC QN AUTO: 31.7 PG (ref 26–34)
MCHC RBC AUTO-ENTMCNC: 33.6 G/DL (ref 30–36.5)
MCV RBC AUTO: 94.5 FL (ref 80–99)
PLATELET # BLD AUTO: 247 K/UL (ref 150–400)
POTASSIUM SERPL-SCNC: 3.9 MMOL/L (ref 3.5–5.1)
RBC # BLD AUTO: 2.93 M/UL (ref 4.1–5.7)
SERVICE CMNT-IMP: ABNORMAL
SODIUM SERPL-SCNC: 138 MMOL/L (ref 136–145)
WBC # BLD AUTO: 11 K/UL (ref 4.1–11.1)

## 2017-01-25 PROCEDURE — 65270000029 HC RM PRIVATE

## 2017-01-25 PROCEDURE — 74011250636 HC RX REV CODE- 250/636: Performed by: FAMILY MEDICINE

## 2017-01-25 PROCEDURE — 74011000250 HC RX REV CODE- 250: Performed by: HOSPITALIST

## 2017-01-25 PROCEDURE — 36415 COLL VENOUS BLD VENIPUNCTURE: CPT | Performed by: HOSPITALIST

## 2017-01-25 PROCEDURE — 82962 GLUCOSE BLOOD TEST: CPT

## 2017-01-25 PROCEDURE — 74011636637 HC RX REV CODE- 636/637: Performed by: HOSPITALIST

## 2017-01-25 PROCEDURE — 85027 COMPLETE CBC AUTOMATED: CPT | Performed by: HOSPITALIST

## 2017-01-25 PROCEDURE — 77010033678 HC OXYGEN DAILY

## 2017-01-25 PROCEDURE — 74011250637 HC RX REV CODE- 250/637: Performed by: FAMILY MEDICINE

## 2017-01-25 PROCEDURE — 80048 BASIC METABOLIC PNL TOTAL CA: CPT | Performed by: HOSPITALIST

## 2017-01-25 PROCEDURE — 94640 AIRWAY INHALATION TREATMENT: CPT

## 2017-01-25 PROCEDURE — 74011250637 HC RX REV CODE- 250/637: Performed by: HOSPITALIST

## 2017-01-25 RX ADMIN — AMLODIPINE BESYLATE 10 MG: 5 TABLET ORAL at 09:57

## 2017-01-25 RX ADMIN — GUAIFENESIN 600 MG: 600 TABLET, EXTENDED RELEASE ORAL at 09:57

## 2017-01-25 RX ADMIN — LEVOFLOXACIN 750 MG: 5 INJECTION, SOLUTION INTRAVENOUS at 22:09

## 2017-01-25 RX ADMIN — GABAPENTIN 800 MG: 400 CAPSULE ORAL at 09:58

## 2017-01-25 RX ADMIN — ACETYLCYSTEINE 400 MG: 200 SOLUTION ORAL; RESPIRATORY (INHALATION) at 14:49

## 2017-01-25 RX ADMIN — IPRATROPIUM BROMIDE AND ALBUTEROL SULFATE 3 ML: .5; 3 SOLUTION RESPIRATORY (INHALATION) at 08:21

## 2017-01-25 RX ADMIN — CITALOPRAM HYDROBROMIDE 20 MG: 20 TABLET ORAL at 09:58

## 2017-01-25 RX ADMIN — ASPIRIN 81 MG CHEWABLE TABLET 81 MG: 81 TABLET CHEWABLE at 09:57

## 2017-01-25 RX ADMIN — GABAPENTIN 800 MG: 400 CAPSULE ORAL at 17:19

## 2017-01-25 RX ADMIN — ACETYLCYSTEINE 400 MG: 200 SOLUTION ORAL; RESPIRATORY (INHALATION) at 08:21

## 2017-01-25 RX ADMIN — SODIUM CHLORIDE 125 ML/HR: 900 INJECTION, SOLUTION INTRAVENOUS at 20:47

## 2017-01-25 RX ADMIN — IPRATROPIUM BROMIDE AND ALBUTEROL SULFATE 3 ML: .5; 3 SOLUTION RESPIRATORY (INHALATION) at 14:49

## 2017-01-25 RX ADMIN — SODIUM CHLORIDE 125 ML/HR: 900 INJECTION, SOLUTION INTRAVENOUS at 10:02

## 2017-01-25 RX ADMIN — IPRATROPIUM BROMIDE AND ALBUTEROL SULFATE 3 ML: .5; 3 SOLUTION RESPIRATORY (INHALATION) at 22:35

## 2017-01-25 RX ADMIN — Medication 10 ML: at 22:10

## 2017-01-25 RX ADMIN — PANTOPRAZOLE SODIUM 40 MG: 40 TABLET, DELAYED RELEASE ORAL at 09:57

## 2017-01-25 RX ADMIN — Medication 10 ML: at 05:58

## 2017-01-25 RX ADMIN — Medication 5 ML: at 14:00

## 2017-01-25 RX ADMIN — GUAIFENESIN 600 MG: 600 TABLET, EXTENDED RELEASE ORAL at 22:09

## 2017-01-25 RX ADMIN — LISINOPRIL 20 MG: 20 TABLET ORAL at 09:57

## 2017-01-25 RX ADMIN — ACETYLCYSTEINE 400 MG: 200 SOLUTION ORAL; RESPIRATORY (INHALATION) at 22:35

## 2017-01-25 RX ADMIN — INSULIN LISPRO 2 UNITS: 100 INJECTION, SOLUTION INTRAVENOUS; SUBCUTANEOUS at 11:54

## 2017-01-25 NOTE — PROGRESS NOTES
Bedside shift change report given to Albina (oncoming nurse) by Duc La   (offgoing nurse). Report included the following information SBAR, Kardex, Intake/Output, MAR and Recent Results.

## 2017-01-25 NOTE — PROGRESS NOTES
Patient on 1L in room at rest O2 sat's @ 93%  Room air at rest O2 sats @ 90%  Patient ambulating in [place] O2 sats dropped to 85% within 15 seconds. O2 placed on patient walking back to room sat up to 88% ambulating; patient recovered to 95% on 2L within approximately 30 seconds. Patient now at rest on 2L satting at 95-96%.

## 2017-01-25 NOTE — PROGRESS NOTES
Joe Dimas MD   Phone/text: [phone] (7am to 7pm)  After 7pm please call  for hospitalist on call    Hospitalist Progress Note     1/25/2017   PCP:  Dr. Sarah Reaves MD  Chief Complaint   Patient presents with    Chest Pain (Angina)    Shortness of Breath       Admission Summary:   Prachi Hess is a 70 y.o. male with past medical history of bladder cancer, chronic kidney disease, anxiety, depression, type 2 diabetes melltius (DM), gout, hypertension, hyperlipidemia, stroke, TIA presented to the ED from home with reported chief complains of chest congestion, cough, shortness of breath, and chest pain. Reason For Visit:  Pneumonia    Assessment/Plan   Sepsis due to Pneumonia (POA) - community acquired with leukocytosis, tachycardia and tachypnea. Getting better and better  - CXR 1/22 with bibasilar opacities  - Bcx 1/22 no growth  - Sputum culture 1/24 pending  - Added mucolytics and nebs  - Continue Levofloxacin started 1/22    Hypoxia (POA) - due to pneumonia but still requiring O2  - Wean oxygen as able but I suspect he is going to need home O2    Hives (POA) - patient was on cefdinir, resolved  - Benadryl prn  - Given 1 dose of solumedrol  - Cefdinir listed as allergy    Chest pain (POA) - likely secondary to persistent coughing and pneumonia. Resolved  - ECG with sinus, poor R-wave progression, non-specific ST changes  - Troponin negative  - Continue telemetry monitoring    Type 2 DM - A1c 7.1, BG acceptable  - Hold metformin  - Lispro SSI    HTN - continue home lisinopril and amlodipine    Anxiety/depression - stable  - Patient is on citalopram and duloxetine as an outpatient, it is relatively contraindicated to be on an SSRI and an SNRI together. Hold duloxitine    See orders for other plans.   VTE prophylaxis: enoxaparin  Discussed plan of care with Patient/Family, Nurse and    Pre-admission lived at home  Discharge plan: to home  Estimated time to discharge: likely tomorrow     Subjective   Mr. Liu Hernandez is doing well. He continues to produce sputum but coughing a little less. No fever. Reviewed interval history    Physical examination     Visit Vitals    /66 (BP 1 Location: Right arm, BP Patient Position: At rest)    Pulse 83    Temp 98.5 °F (36.9 °C)    Resp 20    Ht 5' 10\" (1.778 m)    Wt 83 kg (182 lb 15.7 oz)    SpO2 96%    BMI 26.26 kg/m2       Temp (24hrs), Av.8 °F (37.1 °C), Min:98 °F (36.7 °C), Max:100 °F (37.8 °C)      Intake/Output Summary (Last 24 hours) at 17 1301  Last data filed at 17 0914   Gross per 24 hour   Intake              300 ml   Output             1050 ml   Net             -750 ml       Gen - NAD  ENT - MMM  Neck - supple, full ROM  CV - RRR, no MRG  Resp - lungs decreased at bases, normal WOB  GI - abdomen S, NT, ND, +BS  Ext - no edema  Skin - hives on chest, arms nearly gone  Neuro - A&O, no focal deficits    Data Review     Telemetry    ECG    Xray    CT scan    MRI    Echocardiogram          I have reviewed the flow sheet and recent notes  New labs and data below personally reviewed.     Recent Labs      17   0247  17   2103   WBC  11.0  15.9*  12.1*   HGB  9.3*  11.2*  12.3   HCT  27.7*  34.0*  37.1   PLT  247  258  294     Recent Labs      17   0247  17   2103   NA  138  134*  133*   K  3.9  4.5  4.6   CL  105  101  98   CO2  23  21  27   GLU  100  255*  141*   BUN  19  12  12   CREA  1.05  1.34*  1.30   CA  8.3*  8.7  9.4   ALB   --    --   3.3*   TBILI   --    --   0.5   SGOT   --    --   14*   ALT   --    --   17       Medications reviewed  Current Facility-Administered Medications   Medication Dose Route Frequency    albuterol-ipratropium (DUO-NEB) 2.5 MG-0.5 MG/3 ML  3 mL Nebulization TID RT    acetylcysteine (MUCOMYST) 200 mg/mL (20 %) solution 400 mg  400 mg Nebulization TID RT    zolpidem (AMBIEN) tablet 5 mg  5 mg Oral QHS PRN    amLODIPine (NORVASC) tablet 10 mg  10 mg Oral DAILY    aspirin chewable tablet 81 mg  81 mg Oral DAILY    citalopram (CELEXA) tablet 20 mg  20 mg Oral DAILY    gabapentin (NEURONTIN) capsule 800 mg  800 mg Oral BID    lisinopril (PRINIVIL, ZESTRIL) tablet 20 mg  20 mg Oral DAILY    pantoprazole (PROTONIX) tablet 40 mg  40 mg Oral DAILY    sodium chloride (NS) flush 5-10 mL  5-10 mL IntraVENous Q8H    sodium chloride (NS) flush 5-10 mL  5-10 mL IntraVENous PRN    guaiFENesin SR (MUCINEX) tablet 600 mg  600 mg Oral Q12H    insulin lispro (HUMALOG) injection   SubCUTAneous AC&HS    glucose chewable tablet 16 g  4 Tab Oral PRN    dextrose (D50W) injection syrg 12.5-25 g  12.5-25 g IntraVENous PRN    glucagon (GLUCAGEN) injection 1 mg  1 mg IntraMUSCular PRN    acetaminophen (TYLENOL) tablet 650 mg  650 mg Oral Q6H PRN    diphenhydrAMINE (BENADRYL) injection 25 mg  25 mg IntraVENous Q6H PRN    0.9% sodium chloride infusion  125 mL/hr IntraVENous CONTINUOUS    levoFLOXacin (LEVAQUIN) 750 mg in D5W IVPB  750 mg IntraVENous Q24H         Sofya Singh MD  Internal Medicine  1/25/2017

## 2017-01-26 VITALS
HEART RATE: 70 BPM | TEMPERATURE: 98.1 F | HEIGHT: 70 IN | SYSTOLIC BLOOD PRESSURE: 153 MMHG | RESPIRATION RATE: 20 BRPM | WEIGHT: 182.98 LBS | DIASTOLIC BLOOD PRESSURE: 68 MMHG | OXYGEN SATURATION: 97 % | BODY MASS INDEX: 26.2 KG/M2

## 2017-01-26 LAB
BACTERIA SPEC CULT: NORMAL
BACTERIA SPEC CULT: NORMAL
GLUCOSE BLD STRIP.AUTO-MCNC: 122 MG/DL (ref 65–100)
GLUCOSE BLD STRIP.AUTO-MCNC: 131 MG/DL (ref 65–100)
GRAM STN SPEC: NORMAL
SERVICE CMNT-IMP: ABNORMAL
SERVICE CMNT-IMP: ABNORMAL
SERVICE CMNT-IMP: NORMAL

## 2017-01-26 PROCEDURE — 77030012341 HC CHMB SPCR OPTC MDI VYRM -A

## 2017-01-26 PROCEDURE — 94664 DEMO&/EVAL PT USE INHALER: CPT

## 2017-01-26 PROCEDURE — 74011250637 HC RX REV CODE- 250/637: Performed by: HOSPITALIST

## 2017-01-26 PROCEDURE — 74011250636 HC RX REV CODE- 250/636: Performed by: FAMILY MEDICINE

## 2017-01-26 PROCEDURE — 82962 GLUCOSE BLOOD TEST: CPT

## 2017-01-26 PROCEDURE — 74011000250 HC RX REV CODE- 250: Performed by: HOSPITALIST

## 2017-01-26 PROCEDURE — 94640 AIRWAY INHALATION TREATMENT: CPT

## 2017-01-26 PROCEDURE — 74011250637 HC RX REV CODE- 250/637: Performed by: FAMILY MEDICINE

## 2017-01-26 RX ORDER — ALBUTEROL SULFATE 90 UG/1
AEROSOL, METERED RESPIRATORY (INHALATION)
Qty: 1 INHALER | Refills: 1 | Status: SHIPPED | OUTPATIENT
Start: 2017-01-26

## 2017-01-26 RX ORDER — SAME BUTANEDISULFONATE/BETAINE 400-600 MG
250 POWDER IN PACKET (EA) ORAL 2 TIMES DAILY
Qty: 60 CAP | Refills: 0 | Status: SHIPPED | OUTPATIENT
Start: 2017-01-26 | End: 2017-02-25

## 2017-01-26 RX ORDER — ALBUTEROL SULFATE 90 UG/1
AEROSOL, METERED RESPIRATORY (INHALATION)
Qty: 1 INHALER | Refills: 1 | Status: SHIPPED | OUTPATIENT
Start: 2017-01-26 | End: 2017-01-26

## 2017-01-26 RX ORDER — LEVOFLOXACIN 500 MG/1
500 TABLET, FILM COATED ORAL DAILY
Qty: 4 TAB | Refills: 0 | Status: SHIPPED | OUTPATIENT
Start: 2017-01-26 | End: 2017-01-30

## 2017-01-26 RX ADMIN — PANTOPRAZOLE SODIUM 40 MG: 40 TABLET, DELAYED RELEASE ORAL at 09:32

## 2017-01-26 RX ADMIN — GABAPENTIN 800 MG: 400 CAPSULE ORAL at 09:32

## 2017-01-26 RX ADMIN — SODIUM CHLORIDE 125 ML/HR: 900 INJECTION, SOLUTION INTRAVENOUS at 06:22

## 2017-01-26 RX ADMIN — LISINOPRIL 20 MG: 20 TABLET ORAL at 09:33

## 2017-01-26 RX ADMIN — ACETYLCYSTEINE 400 MG: 200 SOLUTION ORAL; RESPIRATORY (INHALATION) at 10:10

## 2017-01-26 RX ADMIN — CITALOPRAM HYDROBROMIDE 20 MG: 20 TABLET ORAL at 09:32

## 2017-01-26 RX ADMIN — AMLODIPINE BESYLATE 10 MG: 5 TABLET ORAL at 09:32

## 2017-01-26 RX ADMIN — GUAIFENESIN 600 MG: 600 TABLET, EXTENDED RELEASE ORAL at 09:33

## 2017-01-26 RX ADMIN — ASPIRIN 81 MG CHEWABLE TABLET 81 MG: 81 TABLET CHEWABLE at 09:32

## 2017-01-26 RX ADMIN — IPRATROPIUM BROMIDE AND ALBUTEROL SULFATE 3 ML: .5; 3 SOLUTION RESPIRATORY (INHALATION) at 10:10

## 2017-01-26 NOTE — ROUTINE PROCESS
Hospital follow up PCP appointment has been scheduled with Dr. Farideh Cheng on Thursday, 2/2/17 at 9:50 a.m. Pending patient discharge.   Kwaku Cam, Care Management Specialist.

## 2017-01-26 NOTE — PROGRESS NOTES
Bedside and Verbal shift change report given to [facility] (oncoming nurse) by Young Pa RN (offgoing nurse). Report included the following information SBAR, Kardex, MAR, Accordion and Recent Results.

## 2017-01-26 NOTE — PROGRESS NOTES
Cm received a consult to set up home oxygen for this pt. Pt has Anna Casper and will need to use their preferred provider, [facility]. Veronica met with pt to discuss this and he is in agreement with using [facility]. VERONICA sent a referral to [employer] through [employer] and also faxed it to [phone]. VERONICA [facility] [address] [phone] and she received all needed information and a representative from [facility] will deliver a portable tank within the next 2 hours. VERONICA informed pt.  Kyara Hilliard, HAWK-LINDA

## 2017-01-26 NOTE — DISCHARGE INSTRUCTIONS
Please bring this form with you to show your primary care provider at your follow-up appointment. Primary care provider:  Dr. Naheed Alatorre MD    Discharging provider:  Derrell Srivastava MD    You have been admitted to the hospital with the following diagnoses:  · Pneumonia    FOLLOW-UP CARE RECOMMENDATIONS:    APPOINTMENTS:  Follow-up Information     Follow up With Details Comments Petar Rivera MD Schedule an appointment as soon as possible for a visit in 1 week For follow up after hospitalization [address] [facility] recommended:   · Chest X-ray in 4-6 weeks to ensure resolution of pneumonia  · 6-minute walk with pulse oximetry in 2 weeks to check on continued need for oxygen    SYMPTOMS to watch for: chest pain, shortness of breath, fever, chills, nausea, vomiting, diarrhea. DIET/what to eat:  Diabetic Diet    ACTIVITY:  Activity as tolerated    OTHER instructions:  Please use your oxygen by nasal cannula at 2 liters per minute continuously. Follow up with your primary care doctor for testing to see if you continue to need this. What to do if new or unexpected symptoms occur? If you experience any of the above symptoms (or should other concerns or questions arise after discharge) please call your primary care physician. Return to the emergency room if you cannot get hold of your doctor. · It is very important that you keep your follow-up appointment(s). · Please bring discharge papers, medication list (and/or medication bottles) to your follow-up appointments for review by your outpatient provider(s). · Please check the list of medications and be sure it includes every medication (even non-prescription medications) that your provider wants you to take. · It is important that you take the medication exactly as they are prescribed.    · Keep your medication in the bottles provided by the pharmacist and keep a list of the medication names, dosages, and times to be taken in your wallet. · Do not take other medications without consulting your doctor. · If you have any questions about your medications or other instructions, please talk to your nurse or care provider before you leave the hospital.    I understand that if any problems occur once I am at home I am to contact my physician. These instructions were explained to me and I had the opportunity to ask questions.

## 2017-01-26 NOTE — DISCHARGE SUMMARY
Discharge Summary     Patient:  Maurisio Dimas       MRN: [id number]       YOB: 1945       Age: 70 y.o. Date of admission:  1/22/2017    Date of discharge:  1/26/2017    Primary care provider: Dr. Lor Larry MD    Admitting provider:  Abiel Taylor MD    Discharging provider:  Lexi Ruffin 91.: [phone]. If unavailable, call [phone] and ask the  to page the triage hospitalist.    Consultations  · IP CONSULT TO HOSPITALIST    Procedures  · * No surgery found *    Discharge destination: Home. The patient is stable for discharge. Admission diagnosis  · Pneumonia    Current Discharge Medication List      START taking these medications    Details   albuterol (PROVENTIL HFA, VENTOLIN HFA, PROAIR HFA) 90 mcg/actuation inhaler Please take 2 puffs by inhalation through spacer four times a day for the next week. After that, please reduce the frequency to every 6 hours as needed for shortness of breath or wheezing. Qty: 1 Inhaler, Refills: 1      levoFLOXacin (LEVAQUIN) 500 mg tablet Take 1 Tab by mouth daily for 4 days. Qty: 4 Tab, Refills: 0      Saccharomyces boulardii (FLORASTOR) 250 mg capsule Take 1 Cap by mouth two (2) times a day for 30 days. OK to substitute other over-the-counter probiotic  Qty: 60 Cap, Refills: 0         CONTINUE these medications which have NOT CHANGED    Details   metFORMIN (GLUCOPHAGE) 500 mg tablet Take 1,000 mg by mouth daily (with breakfast). atorvastatin (LIPITOR) 20 mg tablet Take 10 mg by mouth daily. cholecalciferol, vitamin D3, (VITAMIN D3) 2,000 unit tab Take  by mouth. aspirin 81 mg chewable tablet Take 1 Tab by mouth daily.  Indications: TRANSIENT CEREBRAL ISCHEMIA  Qty: 30 Tab, Refills: 1      OTHER,NON-FORMULARY, Physical therapy to eval and treat  Qty: 1 Each, Refills: 0      allopurinol (ZYLOPRIM) 300 mg tablet Take 1 tablet by mouth two (2) times a day. For gout prevention  Qty: 60 tablet, Refills: 5    Associated Diagnoses: Gout      amLODIPine (NORVASC) 10 mg tablet Take 1 Tab by mouth daily. Qty: 30 Tab, Refills: 2      gabapentin (NEURONTIN) 800 mg tablet TAKE TWO TABLETS BY MOUTH TWICE DAILY  Qty: 120 Tab, Refills: 0      DULoxetine (CYMBALTA) 20 mg capsule Take 60 mg by mouth daily. lisinopril (PRINIVIL, ZESTRIL) 20 mg tablet Take 1 Tab by mouth daily. Qty: 30 Tab, Refills: 5    Associated Diagnoses: HTN (hypertension)      pantoprazole (PROTONIX) 40 mg tablet Take 1 Tab by mouth daily. Qty: 30 Tab, Refills: 5      citalopram (CELEXA) 40 mg tablet Take 1 Tab by mouth daily. Qty: 90 Tab, Refills: 1      meloxicam (MOBIC) 15 mg tablet Take 1 Tab by mouth daily. Qty: 30 Tab, Refills: 5      multivitamins-minerals-lutein (CENTRUM SILVER) Tab Take 1 Tab by mouth daily. Qty: 90 Tab, Refills: 1         STOP taking these medications       cefdinir (OMNICEF) 300 mg capsule Comments:   Reason for Stopping: Follow-up Information     Follow up With Details Comments Contact Info    Alexandria Sterling MD Schedule an appointment as soon as possible for a visit in 1 week For follow up after hospitalization [address] [place] [address]  [phone]            Final discharge diagnoses and brief hospital course  Please also refer to the admission H&P for details on the presenting problem. Riddhi Gillette is a 70 y.o. male with past medical history of bladder cancer, chronic kidney disease, anxiety, depression, type 2 diabetes melltius (DM), gout, hypertension, hyperlipidemia, stroke, TIA presented to the ED from home with reported chief complains of chest congestion, cough, shortness of breath, and chest pain.     Sepsis due to Pneumonia (POA) - community acquired with leukocytosis, tachycardia and tachypnea.  Getting better and better  - CXR 1/22 with bibasilar opacities  - Bcx 1/22 no growth  - Sputum culture 1/24 with normal respiratory bill  - Added mucolytics and nebs  - Continue Levofloxacin started 1/22. Continue at home to complete 7 day course     Hypoxia (POA) - due to pneumonia but still requiring O2  - Home oxygen arranged. Follow up with primary care doctor as pneumonia resolves to wean     Hives (POA) - patient was on cefdinir, resolved  - Benadryl prn  - Given 1 dose of solumedrol  - Cefdinir listed as allergy     Chest pain (POA) - likely secondary to persistent coughing and pneumonia. Resolved  - ECG with sinus, poor R-wave progression, non-specific ST changes  - Troponin negative  - Continue telemetry monitoring     Type 2 DM - A1c 7.1, BG acceptable  - Hold metformin  - Lispro SSI     HTN - continue home lisinopril and amlodipine     Anxiety/depression - stable  - Patient is on citalopram and duloxetine as an outpatient, but it is relatively contraindicated to be on an SSRI and an SNRI together. Follow up with PCP to discuss discontinuing one or the other of these medications. FOLLOW-UP CARE RECOMMENDATIONS:     FOLLOW-UP TESTS recommended:   · Chest X-ray in 4-6 weeks to ensure resolution of pneumonia  · 6-minute walk with pulse oximetry in 2 weeks to check on continued need for oxygen    SYMPTOMS to watch for: chest pain, shortness of breath, fever, chills, nausea, vomiting, diarrhea. DIET/what to eat:  Diabetic Diet    ACTIVITY:  Activity as tolerated    OTHER instructions:  Please use your oxygen by nasal cannula at 2 liters per minute continuously. Follow up with your primary care doctor for testing to see if you continue to need this. What to do if new or unexpected symptoms occur? If you experience any of the above symptoms (or should other concerns or questions arise after discharge) please call your primary care physician. Return to the emergency room if you cannot get hold of your doctor. · It is very important that you keep your follow-up appointment(s).   · Please bring discharge papers, medication list (and/or medication bottles) to your follow-up appointments for review by your outpatient provider(s). · Please check the list of medications and be sure it includes every medication (even non-prescription medications) that your provider wants you to take. · It is important that you take the medication exactly as they are prescribed. · Keep your medication in the bottles provided by the pharmacist and keep a list of the medication names, dosages, and times to be taken in your wallet. · Do not take other medications without consulting your doctor. · If you have any questions about your medications or other instructions, please talk to your nurse or care provider before you leave the hospital.      Physical examination at discharge  Visit Vitals    /68    Pulse 70    Temp 98.1 °F (36.7 °C)    Resp 20    Ht 5' 10\" (1.778 m)    Wt 83 kg (182 lb 15.7 oz)    SpO2 92%    BMI 26.26 kg/m2       Gen - NAD  ENT - MMM  Neck - supple, full ROM  CV - RRR, no MRG  Resp - lungs decreased at bases, normal WOB  GI - abdomen S, NT, ND, +BS  Ext - no edema  Skin - hives on chest, arms resolved  Neuro - A&O, no focal deficits    Pertinent imaging studies:    CXR 1/22/17  FINDINGS: PA and lateral views of the chest demonstrate a stable  cardiomediastinal silhouette and bibasilar opacities. There is chronic  cardiomegaly. The visualized osseous structures are unremarkable. IMPRESSION: Bibasilar opacities consistent with pneumonia. Recent Labs      01/25/17   0313   WBC  11.0   HGB  9.3*   HCT  27.7*   PLT  247     Recent Labs      01/25/17   0313   NA  138   K  3.9   CL  105   CO2  23   BUN  19   CREA  1.05   GLU  100   CA  8.3*     No results for input(s): SGOT, GPT, AP, TBIL, TP, ALB, GLOB, GGT, AML, LPSE in the last 72 hours. No lab exists for component: AMYP, HLPSE  No results for input(s): INR, PTP, APTT in the last 72 hours.     No lab exists for component: INREXT   No results for input(s): FE, TIBC, PSAT, FERR in the last 72 hours. No results for input(s): PH, PCO2, PO2 in the last 72 hours. No results for input(s): CPK, CKMB in the last 72 hours. No lab exists for component: TROPONINI  No components found for: [address] [facility]    Chronic Diagnoses:    Problem List as of 1/26/2017  Date Reviewed: 1/22/2017          Codes Class Noted - Resolved    * (Principal)Pneumonia ICD-10-CM: J18.9  ICD-9-CM: 554  1/22/2017 - Present        TIA (transient ischemic attack) ICD-10-CM: G45.9  ICD-9-CM: 435.9  8/16/2016 - Present        Diabetes mellitus type II, controlled ([facility] ICD-10-CM: E11.9  ICD-9-CM: 250.00  11/13/2013 - Present        Hypertension ICD-10-CM: I10  ICD-9-CM: 401.9  9/24/2013 - Present        Hyperlipidemia ICD-10-CM: E78.5  ICD-9-CM: 272.4  9/24/2013 - Present        Dyspnea ICD-10-CM: R06.00  ICD-9-CM: 786.09  9/24/2013 - Present        Renal insufficiency ICD-10-CM: N28.9  ICD-9-CM: 593.9  9/24/2013 - Present        Gout ICD-10-CM: M10.9  ICD-9-CM: 274.9  9/24/2013 - Present        RESOLVED: Diabetes mellitus type II, uncontrolled ([facility] ICD-10-CM: E11.65  ICD-9-CM: 250.02  9/24/2013 - 11/13/2013              Time spent on discharge related activities today greater than 30 minutes.       Signed:  Sonal Batista MD                 Hospitalist, Internal Medicine      Cc: Christa Devries MD

## 2017-01-26 NOTE — PROGRESS NOTES
Patient discharged to home with prescriptions ready at [facility] [address] [facility] in [place]. No questions at discharge, home O2 set up with Lucas Fuchs, has followup appointment with PCP on February 2, 2017. All personal belongings left with patient and IV's were removed.

## 2017-01-26 NOTE — PROGRESS NOTES
Problem: Discharge Planning  Goal: *Discharge to safe environment  Outcome: Progressing Towards Goal  Returning home today with home O2, respiratory educator has come and provided teaching.

## 2017-01-26 NOTE — PROGRESS NOTES
Bedside shift change report given to [address] (oncoming nurse) by Patsy Chan (offgoing nurse). Report included the following information SBAR, Kardex and MAR.

## 2017-01-27 LAB
BACTERIA SPEC CULT: NORMAL
SERVICE CMNT-IMP: NORMAL

## 2017-02-02 ENCOUNTER — HOSPITAL ENCOUNTER (OUTPATIENT)
Dept: GENERAL RADIOLOGY | Age: 72
Discharge: HOME | End: 2017-02-02
Payer: MEDICARE

## 2017-02-02 DIAGNOSIS — J18.9 PNEUMONIA: ICD-10-CM

## 2017-02-02 PROCEDURE — 71020 XR CHEST PA LAT: CPT

## 2017-02-15 ENCOUNTER — HOSPITAL ENCOUNTER (OUTPATIENT)
Dept: GENERAL RADIOLOGY | Age: 72
Discharge: HOME | End: 2017-02-15
Payer: MEDICARE

## 2017-02-15 DIAGNOSIS — J18.9 UNRESOLVED PNEUMONIA: ICD-10-CM

## 2017-02-15 PROCEDURE — 71020 XR CHEST PA LAT: CPT

## 2017-03-20 ENCOUNTER — HOSPITAL ENCOUNTER (OUTPATIENT)
Dept: GENERAL RADIOLOGY | Age: 72
Discharge: HOME | End: 2017-03-20
Payer: MEDICARE

## 2017-03-20 DIAGNOSIS — J18.9 PNEUMONIA DUE TO ORGANISM: ICD-10-CM

## 2017-03-20 PROCEDURE — 71020 XR CHEST PA LAT: CPT

## 2017-03-29 ENCOUNTER — HOSPITAL ENCOUNTER (OUTPATIENT)
Dept: CT IMAGING | Age: 72
Discharge: HOME | End: 2017-03-29
Attending: INTERNAL MEDICINE
Payer: MEDICARE

## 2017-03-29 DIAGNOSIS — J84.9 INTERSTITIAL PNEUMONIA (HCC): ICD-10-CM

## 2017-03-29 PROCEDURE — 71250 CT THORAX DX C-: CPT

## 2017-08-03 ENCOUNTER — HOSPITAL ENCOUNTER (OUTPATIENT)
Dept: CT IMAGING | Age: 72
Discharge: HOME | End: 2017-08-03
Payer: MEDICARE

## 2017-08-03 DIAGNOSIS — J84.9 INTERSTITIAL LUNG DISEASE (HCC): ICD-10-CM

## 2017-08-03 PROCEDURE — 71250 CT THORAX DX C-: CPT

## 2018-08-13 ENCOUNTER — HOSPITAL ENCOUNTER (EMERGENCY)
Age: 73
Discharge: HOME | End: 2018-08-13
Attending: STUDENT IN AN ORGANIZED HEALTH CARE EDUCATION/TRAINING PROGRAM
Payer: MEDICARE

## 2018-08-13 VITALS
RESPIRATION RATE: 18 BRPM | SYSTOLIC BLOOD PRESSURE: 178 MMHG | DIASTOLIC BLOOD PRESSURE: 72 MMHG | BODY MASS INDEX: 25.47 KG/M2 | OXYGEN SATURATION: 98 % | HEART RATE: 72 BPM | WEIGHT: 177.91 LBS | TEMPERATURE: 98 F | HEIGHT: 70 IN

## 2018-08-13 DIAGNOSIS — F41.8 ANXIETY ASSOCIATED WITH DEPRESSION: Primary | ICD-10-CM

## 2018-08-13 PROCEDURE — 99282 EMERGENCY DEPT VISIT SF MDM: CPT

## 2018-08-13 PROCEDURE — 90791 PSYCH DIAGNOSTIC EVALUATION: CPT

## 2018-08-13 NOTE — ED TRIAGE NOTES
Pt states that he has had increased anxiety and depression off and on since April but has been more intense for the past 1-2 weeks. Pt was taken off all of his anxiety medications in April and was placed on Abilify and recently came off of it 2 weeks ago.

## 2018-08-13 NOTE — DISCHARGE INSTRUCTIONS

## 2018-08-14 NOTE — ED PROVIDER NOTES
HPI Comments: 67 y.o. male with past medical history significant for Hypertension, Hypercholesterolemia, Gout, Bladder Cancer, Diabetes, CKD, Stroke, and Depression who presents from home with chief complaint of anxiety. Patient reports onset in April of intermittent anxiety and depression symptoms. Patient reports previously taking Celexa, Cymbalta, and Gabapentin for his anxiety and depression symptoms, but was taken off of the medications in April due to the medications \"not working anymore. \" Patient reports he was then placed on Abilify but stopped taking it \"two weeks ago. \" Patient presents to [facility] ED today continuation of anxiety symptoms that have been worsening over the past \"three weeks. \" Patient states he experiences constant anxiety described as feeling \"tense\" and \"overwhelmed. \" Patient states he \"came to get on new medications for anxiety and depression. \" Patient notes he is a former alcoholic. Pt denies SI, HI, fever, chills, cough, congestion, shortness of breath, chest pain, abdominal pain, nausea, vomiting, diarrhea, difficulty with urination or dysuria. There are no other acute medical concerns at this time. PCP: Amado Christy MD    Note written by Frantz Melendez, as dictated by Iker Armas MD 8:41 PM      The history is provided by the patient and the spouse.         Past Medical History:   Diagnosis Date    Bladder cancer ([facility] 2010    Cancer [facility])     bladder    Chronic kidney disease     reduced kidney function    Depression     anxiety    Diabetes ([facility]     Gout     Hypercholesterolemia     Hyperlipidemia     Hypertension     Ill-defined condition     idiopathic pulmonary fibrosis    Kidney function abnormal     Stroke ([address]     TIA    Wears dentures        Past Surgical History:   Procedure Laterality Date    HX CATARACT REMOVAL      HX COLONOSCOPY  2014    HX ORTHOPAEDIC      right shoulder rotator cuff    HX ORTHOPAEDIC      left knee repair    HX RETINAL DETACHMENT REPAIR           Family History:   Problem Relation Age of Onset    Heart Disease Mother     Heart Disease Father        Social History     Social History    Marital status:      Spouse name: N/A    Number of children: N/A    Years of education: N/A     Occupational History    Not on file. Social History Main Topics    Smoking status: Former Smoker    Smokeless tobacco: Never Used    Alcohol use No    Drug use: No    Sexual activity: Not on file     Other Topics Concern    Not on file     Social History Narrative         ALLERGIES: Cefdinir and Crestor [rosuvastatin]    Review of Systems   Constitutional: Negative for chills and fever. HENT: Negative for congestion and sore throat. Eyes: Negative for pain. Respiratory: Negative for cough and shortness of breath. Cardiovascular: Negative for chest pain. Gastrointestinal: Negative for abdominal pain, diarrhea, nausea and vomiting. Genitourinary: Negative for difficulty urinating and dysuria. Skin: Negative for rash. Neurological: Negative for syncope and headaches. Psychiatric/Behavioral: Negative for confusion and suicidal ideas. All other systems reviewed and are negative. Vitals:    08/13/18 1831 08/13/18 2011   BP: 189/83 178/72   Pulse: 70 72   Resp: 16 18   Temp: 97.9 °F (36.6 °C) 98 °F (36.7 °C)   SpO2: 97% 98%   Weight: 80.7 kg (177 lb 14.6 oz)    Height: 5' 10\" (1.778 m)             Physical Exam   Constitutional: He is oriented to person, place, and time. He appears well-developed. No distress. HENT:   Head: Normocephalic and atraumatic. Eyes: Conjunctivae and EOM are normal.   Neck: Normal range of motion. Neck supple. Cardiovascular: Normal rate, regular rhythm and normal heart sounds. No murmur heard. Pulmonary/Chest: Effort normal and breath sounds normal. No respiratory distress. Abdominal: Soft. Bowel sounds are normal. He exhibits no distension. There is no tenderness.  There is no rebound. Musculoskeletal: Normal range of motion. He exhibits no edema or tenderness. Neurological: He is alert and oriented to person, place, and time. No cranial nerve deficit. He exhibits normal muscle tone. Coordination normal.   Skin: Skin is warm and dry. Psychiatric: He expresses no homicidal and no suicidal ideation. Nursing note and vitals reviewed. Note written by Frantz Herzog, as dictated by Alan Oleary MD 8:41 PM      [facility]      ED Course       Procedures  8:32 PM  Patient's results have been reviewed with them. Patient and family have verbally conveyed their understanding and agreement of the patient's signs, symptoms, diagnosis, treatment and prognosis and additionally agree to follow up as recommended or return to the Emergency Room should their condition change prior to follow-up. Discharge instructions have also been provided to the patient with some educational information regarding their diagnosis as well a list of reasons why they would want to return to the ER prior to their follow-up appointment should their condition change.

## 2019-09-30 ENCOUNTER — HOSPITAL ENCOUNTER (OUTPATIENT)
Dept: CT IMAGING | Age: 74
Discharge: HOME | End: 2019-09-30
Attending: UROLOGY
Payer: MEDICARE

## 2019-09-30 DIAGNOSIS — C67.9 BLADDER CANCER (HCC): ICD-10-CM

## 2019-09-30 LAB — CREAT BLD-MCNC: 0.8 MG/DL (ref 0.6–1.3)

## 2019-09-30 PROCEDURE — 82565 ASSAY OF CREATININE: CPT

## 2019-09-30 PROCEDURE — 74177 CT ABD & PELVIS W/CONTRAST: CPT

## 2019-09-30 PROCEDURE — 74011000255 HC RX REV CODE- 255: Performed by: UROLOGY

## 2019-09-30 PROCEDURE — 74011636320 HC RX REV CODE- 636/320: Performed by: UROLOGY

## 2019-09-30 PROCEDURE — 74011250636 HC RX REV CODE- 250/636: Performed by: UROLOGY

## 2019-09-30 RX ORDER — BARIUM SULFATE 20 MG/ML
900 SUSPENSION ORAL ONCE
Status: COMPLETED | OUTPATIENT
Start: 2019-09-30 | End: 2019-09-30

## 2019-09-30 RX ORDER — SODIUM CHLORIDE 9 MG/ML
50 INJECTION, SOLUTION INTRAVENOUS ONCE
Status: COMPLETED | OUTPATIENT
Start: 2019-09-30 | End: 2019-09-30

## 2019-09-30 RX ORDER — SODIUM CHLORIDE 0.9 % (FLUSH) 0.9 %
10 SYRINGE (ML) INJECTION ONCE
Status: COMPLETED | OUTPATIENT
Start: 2019-09-30 | End: 2019-09-30

## 2019-09-30 RX ADMIN — BARIUM SULFATE 900 ML: 20 SUSPENSION ORAL at 12:10

## 2019-09-30 RX ADMIN — SODIUM CHLORIDE 50 ML/HR: 900 INJECTION, SOLUTION INTRAVENOUS at 12:10

## 2019-09-30 RX ADMIN — Medication 10 ML: at 12:10

## 2019-09-30 RX ADMIN — IOPAMIDOL 100 ML: 755 INJECTION, SOLUTION INTRAVENOUS at 12:10

## 2019-10-28 ENCOUNTER — HOSPITAL ENCOUNTER (OUTPATIENT)
Dept: CT IMAGING | Age: 74
Discharge: HOME | End: 2019-10-28
Attending: INTERNAL MEDICINE
Payer: MEDICARE

## 2019-10-28 DIAGNOSIS — J84.9 INTERSTITIAL LUNG DISEASE (HCC): ICD-10-CM

## 2019-10-28 PROCEDURE — 71250 CT THORAX DX C-: CPT
